# Patient Record
Sex: FEMALE | Race: WHITE | NOT HISPANIC OR LATINO | URBAN - METROPOLITAN AREA
[De-identification: names, ages, dates, MRNs, and addresses within clinical notes are randomized per-mention and may not be internally consistent; named-entity substitution may affect disease eponyms.]

---

## 2019-05-26 ENCOUNTER — INPATIENT HOSPITAL (OUTPATIENT)
Dept: URBAN - METROPOLITAN AREA HOSPITAL 107 | Facility: HOSPITAL | Age: 64
End: 2019-05-26

## 2019-05-26 DIAGNOSIS — R79.89 OTHER SPECIFIED ABNORMAL FINDINGS OF BLOOD CHEMISTRY: ICD-10-CM

## 2019-05-26 DIAGNOSIS — J44.1 CHRONIC OBSTRUCTIVE PULMONARY DISEASE WITH (ACUTE) EXACERBAT: ICD-10-CM

## 2019-05-26 DIAGNOSIS — R13.10 DYSPHAGIA, UNSPECIFIED: ICD-10-CM

## 2019-05-26 PROCEDURE — 99223 1ST HOSP IP/OBS HIGH 75: CPT | Performed by: INTERNAL MEDICINE

## 2019-05-28 ENCOUNTER — EMERGENCY ROOM – HOSPITAL (OUTPATIENT)
Dept: URBAN - METROPOLITAN AREA HOSPITAL 107 | Facility: HOSPITAL | Age: 64
End: 2019-05-28

## 2019-05-28 DIAGNOSIS — R13.19 OTHER DYSPHAGIA: ICD-10-CM

## 2019-05-28 DIAGNOSIS — R13.10 DYSPHAGIA, UNSPECIFIED: ICD-10-CM

## 2019-05-28 PROCEDURE — 99232 SBSQ HOSP IP/OBS MODERATE 35: CPT | Performed by: PHYSICIAN ASSISTANT

## 2019-05-30 PROCEDURE — 99232 SBSQ HOSP IP/OBS MODERATE 35: CPT | Performed by: PHYSICIAN ASSISTANT

## 2019-05-31 ENCOUNTER — INPATIENT HOSPITAL (OUTPATIENT)
Dept: URBAN - METROPOLITAN AREA HOSPITAL 107 | Facility: HOSPITAL | Age: 64
End: 2019-05-31

## 2019-05-31 DIAGNOSIS — K22.2 ESOPHAGEAL OBSTRUCTION: ICD-10-CM

## 2019-05-31 DIAGNOSIS — K44.9 DIAPHRAGMATIC HERNIA WITHOUT OBSTRUCTION OR GANGRENE: ICD-10-CM

## 2019-05-31 DIAGNOSIS — R13.10 DYSPHAGIA, UNSPECIFIED: ICD-10-CM

## 2019-05-31 PROCEDURE — 43249 ESOPH EGD DILATION <30 MM: CPT

## 2019-06-01 ENCOUNTER — INPATIENT HOSPITAL (OUTPATIENT)
Dept: URBAN - METROPOLITAN AREA HOSPITAL 107 | Facility: HOSPITAL | Age: 64
End: 2019-06-01

## 2019-06-01 DIAGNOSIS — R13.10 DYSPHAGIA, UNSPECIFIED: ICD-10-CM

## 2019-06-01 DIAGNOSIS — Z98.890 OTHER SPECIFIED POSTPROCEDURAL STATES: ICD-10-CM

## 2019-06-01 DIAGNOSIS — J44.1 CHRONIC OBSTRUCTIVE PULMONARY DISEASE WITH (ACUTE) EXACERBAT: ICD-10-CM

## 2019-06-01 PROCEDURE — 99231 SBSQ HOSP IP/OBS SF/LOW 25: CPT | Performed by: INTERNAL MEDICINE

## 2019-06-03 ENCOUNTER — INPATIENT HOSPITAL (OUTPATIENT)
Dept: URBAN - METROPOLITAN AREA HOSPITAL 107 | Facility: HOSPITAL | Age: 64
End: 2019-06-03

## 2019-06-03 DIAGNOSIS — K44.9 DIAPHRAGMATIC HERNIA WITHOUT OBSTRUCTION OR GANGRENE: ICD-10-CM

## 2019-06-03 DIAGNOSIS — R13.19 OTHER DYSPHAGIA: ICD-10-CM

## 2019-06-03 DIAGNOSIS — K22.2 ESOPHAGEAL OBSTRUCTION: ICD-10-CM

## 2019-06-03 DIAGNOSIS — R13.10 DYSPHAGIA, UNSPECIFIED: ICD-10-CM

## 2019-06-03 PROCEDURE — 99232 SBSQ HOSP IP/OBS MODERATE 35: CPT | Performed by: PHYSICIAN ASSISTANT

## 2019-08-11 ENCOUNTER — INPATIENT HOSPITAL (OUTPATIENT)
Dept: URBAN - METROPOLITAN AREA HOSPITAL 107 | Facility: HOSPITAL | Age: 64
End: 2019-08-11

## 2019-08-11 DIAGNOSIS — K22.2 ESOPHAGEAL OBSTRUCTION: ICD-10-CM

## 2019-08-11 DIAGNOSIS — K22.4 DYSKINESIA OF ESOPHAGUS: ICD-10-CM

## 2019-08-11 DIAGNOSIS — R11.2 NAUSEA WITH VOMITING, UNSPECIFIED: ICD-10-CM

## 2019-08-11 PROCEDURE — 99223 1ST HOSP IP/OBS HIGH 75: CPT | Performed by: INTERNAL MEDICINE

## 2019-08-12 ENCOUNTER — INPATIENT HOSPITAL (OUTPATIENT)
Dept: URBAN - METROPOLITAN AREA HOSPITAL 107 | Facility: HOSPITAL | Age: 64
End: 2019-08-12

## 2019-08-12 DIAGNOSIS — K22.2 ESOPHAGEAL OBSTRUCTION: ICD-10-CM

## 2019-08-12 DIAGNOSIS — R11.2 NAUSEA WITH VOMITING, UNSPECIFIED: ICD-10-CM

## 2019-08-12 DIAGNOSIS — K31.84 GASTROPARESIS: ICD-10-CM

## 2019-08-12 DIAGNOSIS — R13.10 DYSPHAGIA, UNSPECIFIED: ICD-10-CM

## 2019-08-12 DIAGNOSIS — R10.9 UNSPECIFIED ABDOMINAL PAIN: ICD-10-CM

## 2019-08-12 PROCEDURE — 99232 SBSQ HOSP IP/OBS MODERATE 35: CPT | Performed by: PHYSICIAN ASSISTANT

## 2019-08-13 PROCEDURE — 99232 SBSQ HOSP IP/OBS MODERATE 35: CPT | Performed by: PHYSICIAN ASSISTANT

## 2019-08-14 ENCOUNTER — INPATIENT HOSPITAL (OUTPATIENT)
Dept: URBAN - METROPOLITAN AREA HOSPITAL 107 | Facility: HOSPITAL | Age: 64
End: 2019-08-14

## 2019-08-14 DIAGNOSIS — R11.2 NAUSEA WITH VOMITING, UNSPECIFIED: ICD-10-CM

## 2019-08-14 DIAGNOSIS — R13.10 DYSPHAGIA, UNSPECIFIED: ICD-10-CM

## 2019-08-14 PROCEDURE — 99232 SBSQ HOSP IP/OBS MODERATE 35: CPT | Performed by: NURSE PRACTITIONER

## 2019-08-15 PROCEDURE — 99232 SBSQ HOSP IP/OBS MODERATE 35: CPT | Performed by: PHYSICIAN ASSISTANT

## 2019-12-11 ENCOUNTER — INPATIENT HOSPITAL (OUTPATIENT)
Dept: URBAN - METROPOLITAN AREA HOSPITAL 107 | Facility: HOSPITAL | Age: 64
End: 2019-12-11

## 2019-12-11 DIAGNOSIS — R11.2 NAUSEA WITH VOMITING, UNSPECIFIED: ICD-10-CM

## 2019-12-11 DIAGNOSIS — K22.4 DYSKINESIA OF ESOPHAGUS: ICD-10-CM

## 2019-12-11 DIAGNOSIS — K22.2 ESOPHAGEAL OBSTRUCTION: ICD-10-CM

## 2019-12-11 DIAGNOSIS — K31.84 GASTROPARESIS: ICD-10-CM

## 2019-12-11 DIAGNOSIS — R13.10 DYSPHAGIA, UNSPECIFIED: ICD-10-CM

## 2019-12-11 PROCEDURE — 99223 1ST HOSP IP/OBS HIGH 75: CPT | Performed by: INTERNAL MEDICINE

## 2019-12-13 ENCOUNTER — APPOINTMENT (OUTPATIENT)
Dept: GENERAL RADIOLOGY | Facility: HOSPITAL | Age: 64
End: 2019-12-13

## 2019-12-13 ENCOUNTER — HOSPITAL ENCOUNTER (INPATIENT)
Facility: HOSPITAL | Age: 64
LOS: 8 days | Discharge: NURSING, REHAB OR LONG-TERM CARE | End: 2019-12-21
Attending: EMERGENCY MEDICINE | Admitting: HOSPITALIST

## 2019-12-13 ENCOUNTER — APPOINTMENT (OUTPATIENT)
Dept: CT IMAGING | Facility: HOSPITAL | Age: 64
End: 2019-12-13

## 2019-12-13 ENCOUNTER — APPOINTMENT (OUTPATIENT)
Dept: CARDIOLOGY | Facility: HOSPITAL | Age: 64
End: 2019-12-13

## 2019-12-13 DIAGNOSIS — J18.9 PNEUMONIA OF BOTH UPPER LOBES DUE TO INFECTIOUS ORGANISM: Primary | ICD-10-CM

## 2019-12-13 DIAGNOSIS — Z74.09 DECREASED MOBILITY AND ENDURANCE: ICD-10-CM

## 2019-12-13 DIAGNOSIS — R07.9 CHEST PAIN, UNSPECIFIED TYPE: ICD-10-CM

## 2019-12-13 DIAGNOSIS — J44.1 COPD EXACERBATION (HCC): ICD-10-CM

## 2019-12-13 PROBLEM — E11.43 GASTROPARESIS DIABETICORUM (HCC): Status: ACTIVE | Noted: 2019-12-13

## 2019-12-13 PROBLEM — Z66 DNR (DO NOT RESUSCITATE): Status: ACTIVE | Noted: 2019-12-13

## 2019-12-13 PROBLEM — J96.12 CHRONIC RESPIRATORY FAILURE WITH HYPOXIA AND HYPERCAPNIA (HCC): Status: ACTIVE | Noted: 2019-12-13

## 2019-12-13 PROBLEM — N18.30 CHRONIC KIDNEY DISEASE, STAGE III (MODERATE) (HCC): Status: ACTIVE | Noted: 2019-12-13

## 2019-12-13 PROBLEM — I48.0 PAROXYSMAL ATRIAL FIBRILLATION (HCC): Status: ACTIVE | Noted: 2019-12-13

## 2019-12-13 PROBLEM — I10 ESSENTIAL HYPERTENSION: Status: ACTIVE | Noted: 2019-12-13

## 2019-12-13 PROBLEM — Z86.19 HISTORY OF ESBL E. COLI INFECTION: Status: ACTIVE | Noted: 2019-12-13

## 2019-12-13 PROBLEM — E11.59 TYPE 2 DIABETES MELLITUS WITH CIRCULATORY DISORDER, WITH LONG-TERM CURRENT USE OF INSULIN (HCC): Status: ACTIVE | Noted: 2019-12-13

## 2019-12-13 PROBLEM — E03.9 HYPOTHYROIDISM (ACQUIRED): Status: ACTIVE | Noted: 2019-12-13

## 2019-12-13 PROBLEM — E71.528: Status: ACTIVE | Noted: 2019-12-13

## 2019-12-13 PROBLEM — E27.1 ADDISON'S DISEASE (HCC): Status: ACTIVE | Noted: 2019-12-13

## 2019-12-13 PROBLEM — I25.10 CORONARY ARTERY DISEASE INVOLVING NATIVE CORONARY ARTERY WITHOUT ANGINA PECTORIS: Status: ACTIVE | Noted: 2019-12-13

## 2019-12-13 PROBLEM — K31.84 GASTROPARESIS DIABETICORUM (HCC): Status: ACTIVE | Noted: 2019-12-13

## 2019-12-13 PROBLEM — Z79.4 TYPE 2 DIABETES MELLITUS WITH CIRCULATORY DISORDER, WITH LONG-TERM CURRENT USE OF INSULIN (HCC): Status: ACTIVE | Noted: 2019-12-13

## 2019-12-13 PROBLEM — J96.11 CHRONIC RESPIRATORY FAILURE WITH HYPOXIA AND HYPERCAPNIA (HCC): Status: ACTIVE | Noted: 2019-12-13

## 2019-12-13 PROBLEM — I50.32 CHRONIC DIASTOLIC CHF (CONGESTIVE HEART FAILURE) (HCC): Status: ACTIVE | Noted: 2019-12-13

## 2019-12-13 LAB
ALBUMIN SERPL-MCNC: 3.4 G/DL (ref 3.5–5.2)
ALBUMIN/GLOB SERPL: 1.2 G/DL
ALP SERPL-CCNC: 89 U/L (ref 39–117)
ALT SERPL W P-5'-P-CCNC: 161 U/L (ref 1–33)
ANION GAP SERPL CALCULATED.3IONS-SCNC: 10.9 MMOL/L (ref 5–15)
APTT PPP: 21.4 SECONDS (ref 22.7–35.4)
AST SERPL-CCNC: 59 U/L (ref 1–32)
B PARAPERT DNA SPEC QL NAA+PROBE: NOT DETECTED
B PERT DNA SPEC QL NAA+PROBE: NOT DETECTED
BASOPHILS # BLD AUTO: 0.07 10*3/MM3 (ref 0–0.2)
BASOPHILS NFR BLD AUTO: 0.4 % (ref 0–1.5)
BH CV LOWER VASCULAR LEFT COMMON FEMORAL AUGMENT: NORMAL
BH CV LOWER VASCULAR LEFT COMMON FEMORAL COMPETENT: NORMAL
BH CV LOWER VASCULAR LEFT COMMON FEMORAL COMPRESS: NORMAL
BH CV LOWER VASCULAR LEFT COMMON FEMORAL PHASIC: NORMAL
BH CV LOWER VASCULAR LEFT COMMON FEMORAL SPONT: NORMAL
BH CV LOWER VASCULAR RIGHT COMMON FEMORAL AUGMENT: NORMAL
BH CV LOWER VASCULAR RIGHT COMMON FEMORAL COMPETENT: NORMAL
BH CV LOWER VASCULAR RIGHT COMMON FEMORAL COMPRESS: NORMAL
BH CV LOWER VASCULAR RIGHT COMMON FEMORAL PHASIC: NORMAL
BH CV LOWER VASCULAR RIGHT COMMON FEMORAL SPONT: NORMAL
BH CV LOWER VASCULAR RIGHT DISTAL FEMORAL COMPRESS: NORMAL
BH CV LOWER VASCULAR RIGHT GASTRONEMIUS COMPRESS: NORMAL
BH CV LOWER VASCULAR RIGHT GREATER SAPH AK COMPRESS: NORMAL
BH CV LOWER VASCULAR RIGHT GREATER SAPH BK COMPRESS: NORMAL
BH CV LOWER VASCULAR RIGHT MID FEMORAL AUGMENT: NORMAL
BH CV LOWER VASCULAR RIGHT MID FEMORAL COMPETENT: NORMAL
BH CV LOWER VASCULAR RIGHT MID FEMORAL COMPRESS: NORMAL
BH CV LOWER VASCULAR RIGHT MID FEMORAL PHASIC: NORMAL
BH CV LOWER VASCULAR RIGHT MID FEMORAL SPONT: NORMAL
BH CV LOWER VASCULAR RIGHT PERONEAL COMPRESS: NORMAL
BH CV LOWER VASCULAR RIGHT POPLITEAL AUGMENT: NORMAL
BH CV LOWER VASCULAR RIGHT POPLITEAL COMPETENT: NORMAL
BH CV LOWER VASCULAR RIGHT POPLITEAL COMPRESS: NORMAL
BH CV LOWER VASCULAR RIGHT POPLITEAL PHASIC: NORMAL
BH CV LOWER VASCULAR RIGHT POPLITEAL SPONT: NORMAL
BH CV LOWER VASCULAR RIGHT POSTERIOR TIBIAL COMPRESS: NORMAL
BH CV LOWER VASCULAR RIGHT PROFUNDA FEMORAL COMPRESS: NORMAL
BH CV LOWER VASCULAR RIGHT PROXIMAL FEMORAL COMPRESS: NORMAL
BH CV LOWER VASCULAR RIGHT SAPHENOFEMORAL JUNCTION COMPRESS: NORMAL
BILIRUB SERPL-MCNC: 0.3 MG/DL (ref 0.2–1.2)
BUN BLD-MCNC: 42 MG/DL (ref 8–23)
BUN/CREAT SERPL: 53.2 (ref 7–25)
C PNEUM DNA NPH QL NAA+NON-PROBE: NOT DETECTED
CALCIUM SPEC-SCNC: 9.2 MG/DL (ref 8.6–10.5)
CHLORIDE SERPL-SCNC: 104 MMOL/L (ref 98–107)
CO2 SERPL-SCNC: 25.1 MMOL/L (ref 22–29)
CREAT BLD-MCNC: 0.79 MG/DL (ref 0.57–1)
D-LACTATE SERPL-SCNC: 1 MMOL/L (ref 0.5–2)
DEPRECATED RDW RBC AUTO: 52.1 FL (ref 37–54)
EOSINOPHIL # BLD AUTO: 0.4 10*3/MM3 (ref 0–0.4)
EOSINOPHIL NFR BLD AUTO: 2.4 % (ref 0.3–6.2)
ERYTHROCYTE [DISTWIDTH] IN BLOOD BY AUTOMATED COUNT: 15.8 % (ref 12.3–15.4)
FLUAV H1 2009 PAND RNA NPH QL NAA+PROBE: NOT DETECTED
FLUAV H1 HA GENE NPH QL NAA+PROBE: NOT DETECTED
FLUAV H3 RNA NPH QL NAA+PROBE: NOT DETECTED
FLUAV SUBTYP SPEC NAA+PROBE: NOT DETECTED
FLUBV RNA ISLT QL NAA+PROBE: NOT DETECTED
GFR SERPL CREATININE-BSD FRML MDRD: 73 ML/MIN/1.73
GLOBULIN UR ELPH-MCNC: 2.9 GM/DL
GLUCOSE BLD-MCNC: 96 MG/DL (ref 65–99)
GLUCOSE BLDC GLUCOMTR-MCNC: 294 MG/DL (ref 70–130)
HADV DNA SPEC NAA+PROBE: NOT DETECTED
HCOV 229E RNA SPEC QL NAA+PROBE: NOT DETECTED
HCOV HKU1 RNA SPEC QL NAA+PROBE: NOT DETECTED
HCOV NL63 RNA SPEC QL NAA+PROBE: NOT DETECTED
HCOV OC43 RNA SPEC QL NAA+PROBE: NOT DETECTED
HCT VFR BLD AUTO: 37.5 % (ref 34–46.6)
HGB BLD-MCNC: 12.7 G/DL (ref 12–15.9)
HMPV RNA NPH QL NAA+NON-PROBE: NOT DETECTED
HPIV1 RNA SPEC QL NAA+PROBE: NOT DETECTED
HPIV2 RNA SPEC QL NAA+PROBE: NOT DETECTED
HPIV3 RNA NPH QL NAA+PROBE: NOT DETECTED
HPIV4 P GENE NPH QL NAA+PROBE: NOT DETECTED
IMM GRANULOCYTES # BLD AUTO: 0.69 10*3/MM3 (ref 0–0.05)
IMM GRANULOCYTES NFR BLD AUTO: 4.1 % (ref 0–0.5)
INR PPP: 0.91 (ref 0.9–1.1)
LYMPHOCYTES # BLD AUTO: 3.01 10*3/MM3 (ref 0.7–3.1)
LYMPHOCYTES NFR BLD AUTO: 18 % (ref 19.6–45.3)
M PNEUMO IGG SER IA-ACNC: NOT DETECTED
MCH RBC QN AUTO: 30.8 PG (ref 26.6–33)
MCHC RBC AUTO-ENTMCNC: 33.9 G/DL (ref 31.5–35.7)
MCV RBC AUTO: 91 FL (ref 79–97)
MONOCYTES # BLD AUTO: 1.16 10*3/MM3 (ref 0.1–0.9)
MONOCYTES NFR BLD AUTO: 6.9 % (ref 5–12)
NEUTROPHILS # BLD AUTO: 11.43 10*3/MM3 (ref 1.7–7)
NEUTROPHILS NFR BLD AUTO: 68.2 % (ref 42.7–76)
NRBC BLD AUTO-RTO: 0.2 /100 WBC (ref 0–0.2)
NT-PROBNP SERPL-MCNC: 1022 PG/ML (ref 5–900)
PLATELET # BLD AUTO: 210 10*3/MM3 (ref 140–450)
PMV BLD AUTO: 11 FL (ref 6–12)
POTASSIUM BLD-SCNC: 3.9 MMOL/L (ref 3.5–5.2)
PROCALCITONIN SERPL-MCNC: 0.12 NG/ML (ref 0.1–0.25)
PROT SERPL-MCNC: 6.3 G/DL (ref 6–8.5)
PROTHROMBIN TIME: 12 SECONDS (ref 11.7–14.2)
RBC # BLD AUTO: 4.12 10*6/MM3 (ref 3.77–5.28)
RHINOVIRUS RNA SPEC NAA+PROBE: NOT DETECTED
RSV RNA NPH QL NAA+NON-PROBE: NOT DETECTED
SODIUM BLD-SCNC: 140 MMOL/L (ref 136–145)
TROPONIN T SERPL-MCNC: <0.01 NG/ML (ref 0–0.03)
WBC NRBC COR # BLD: 16.76 10*3/MM3 (ref 3.4–10.8)

## 2019-12-13 PROCEDURE — 94799 UNLISTED PULMONARY SVC/PX: CPT

## 2019-12-13 PROCEDURE — 87641 MR-STAPH DNA AMP PROBE: CPT | Performed by: INTERNAL MEDICINE

## 2019-12-13 PROCEDURE — 93005 ELECTROCARDIOGRAM TRACING: CPT | Performed by: EMERGENCY MEDICINE

## 2019-12-13 PROCEDURE — 0100U HC BIOFIRE FILMARRAY RESP PANEL 2: CPT | Performed by: PHYSICIAN ASSISTANT

## 2019-12-13 PROCEDURE — 82962 GLUCOSE BLOOD TEST: CPT

## 2019-12-13 PROCEDURE — 71275 CT ANGIOGRAPHY CHEST: CPT

## 2019-12-13 PROCEDURE — 84484 ASSAY OF TROPONIN QUANT: CPT | Performed by: PHYSICIAN ASSISTANT

## 2019-12-13 PROCEDURE — 84145 PROCALCITONIN (PCT): CPT | Performed by: EMERGENCY MEDICINE

## 2019-12-13 PROCEDURE — 99285 EMERGENCY DEPT VISIT HI MDM: CPT

## 2019-12-13 PROCEDURE — 87040 BLOOD CULTURE FOR BACTERIA: CPT | Performed by: EMERGENCY MEDICINE

## 2019-12-13 PROCEDURE — 93971 EXTREMITY STUDY: CPT

## 2019-12-13 PROCEDURE — 63710000001 INSULIN GLARGINE PER 5 UNITS: Performed by: INTERNAL MEDICINE

## 2019-12-13 PROCEDURE — 85610 PROTHROMBIN TIME: CPT | Performed by: PHYSICIAN ASSISTANT

## 2019-12-13 PROCEDURE — 83605 ASSAY OF LACTIC ACID: CPT | Performed by: EMERGENCY MEDICINE

## 2019-12-13 PROCEDURE — 85730 THROMBOPLASTIN TIME PARTIAL: CPT | Performed by: PHYSICIAN ASSISTANT

## 2019-12-13 PROCEDURE — 83880 ASSAY OF NATRIURETIC PEPTIDE: CPT | Performed by: PHYSICIAN ASSISTANT

## 2019-12-13 PROCEDURE — 80053 COMPREHEN METABOLIC PANEL: CPT | Performed by: PHYSICIAN ASSISTANT

## 2019-12-13 PROCEDURE — 25010000002 PIPERACILLIN SOD-TAZOBACTAM PER 1 G: Performed by: INTERNAL MEDICINE

## 2019-12-13 PROCEDURE — 25010000002 CEFTRIAXONE PER 250 MG: Performed by: EMERGENCY MEDICINE

## 2019-12-13 PROCEDURE — 93005 ELECTROCARDIOGRAM TRACING: CPT

## 2019-12-13 PROCEDURE — 93010 ELECTROCARDIOGRAM REPORT: CPT | Performed by: INTERNAL MEDICINE

## 2019-12-13 PROCEDURE — 94640 AIRWAY INHALATION TREATMENT: CPT

## 2019-12-13 PROCEDURE — 85025 COMPLETE CBC W/AUTO DIFF WBC: CPT | Performed by: PHYSICIAN ASSISTANT

## 2019-12-13 PROCEDURE — 25010000002 AZITHROMYCIN PER 500 MG: Performed by: EMERGENCY MEDICINE

## 2019-12-13 PROCEDURE — 84484 ASSAY OF TROPONIN QUANT: CPT | Performed by: INTERNAL MEDICINE

## 2019-12-13 PROCEDURE — 25010000002 ENOXAPARIN PER 10 MG: Performed by: INTERNAL MEDICINE

## 2019-12-13 PROCEDURE — 25010000002 METHYLPREDNISOLONE PER 125 MG: Performed by: PHYSICIAN ASSISTANT

## 2019-12-13 PROCEDURE — 63710000001 INSULIN LISPRO (HUMAN) PER 5 UNITS: Performed by: INTERNAL MEDICINE

## 2019-12-13 PROCEDURE — 25010000002 METHYLPREDNISOLONE PER 125 MG: Performed by: INTERNAL MEDICINE

## 2019-12-13 PROCEDURE — 0 IOPAMIDOL PER 1 ML: Performed by: EMERGENCY MEDICINE

## 2019-12-13 RX ORDER — METHYLPREDNISOLONE SODIUM SUCCINATE 125 MG/2ML
60 INJECTION, POWDER, LYOPHILIZED, FOR SOLUTION INTRAMUSCULAR; INTRAVENOUS EVERY 8 HOURS
Status: DISCONTINUED | OUTPATIENT
Start: 2019-12-13 | End: 2019-12-16

## 2019-12-13 RX ORDER — ARFORMOTEROL TARTRATE 15 UG/2ML
15 SOLUTION RESPIRATORY (INHALATION)
Status: DISCONTINUED | OUTPATIENT
Start: 2019-12-13 | End: 2019-12-21 | Stop reason: HOSPADM

## 2019-12-13 RX ORDER — ALLOPURINOL 100 MG/1
100 TABLET ORAL DAILY
Status: DISCONTINUED | OUTPATIENT
Start: 2019-12-14 | End: 2019-12-21 | Stop reason: HOSPADM

## 2019-12-13 RX ORDER — AMLODIPINE BESYLATE 10 MG/1
10 TABLET ORAL DAILY
COMMUNITY

## 2019-12-13 RX ORDER — CLONAZEPAM 0.5 MG/1
0.25 TABLET ORAL 2 TIMES DAILY
COMMUNITY

## 2019-12-13 RX ORDER — GUAIFENESIN AND DEXTROMETHORPHAN HYDROBROMIDE 600; 30 MG/1; MG/1
1 TABLET, EXTENDED RELEASE ORAL 2 TIMES DAILY
Status: DISCONTINUED | OUTPATIENT
Start: 2019-12-13 | End: 2019-12-21 | Stop reason: HOSPADM

## 2019-12-13 RX ORDER — ERGOCALCIFEROL 1.25 MG/1
50000 CAPSULE ORAL WEEKLY
COMMUNITY

## 2019-12-13 RX ORDER — IPRATROPIUM BROMIDE AND ALBUTEROL SULFATE 2.5; .5 MG/3ML; MG/3ML
3 SOLUTION RESPIRATORY (INHALATION) ONCE
Status: COMPLETED | OUTPATIENT
Start: 2019-12-13 | End: 2019-12-13

## 2019-12-13 RX ORDER — NICOTINE POLACRILEX 4 MG
15 LOZENGE BUCCAL
Status: DISCONTINUED | OUTPATIENT
Start: 2019-12-13 | End: 2019-12-13

## 2019-12-13 RX ORDER — CLONAZEPAM 1 MG/1
2 TABLET ORAL 2 TIMES DAILY
COMMUNITY
End: 2019-12-13

## 2019-12-13 RX ORDER — CLONAZEPAM 0.5 MG/1
0.25 TABLET ORAL 2 TIMES DAILY
Status: DISCONTINUED | OUTPATIENT
Start: 2019-12-13 | End: 2019-12-21 | Stop reason: HOSPADM

## 2019-12-13 RX ORDER — ACETAMINOPHEN 160 MG/5ML
650 SOLUTION ORAL EVERY 4 HOURS PRN
Status: DISCONTINUED | OUTPATIENT
Start: 2019-12-13 | End: 2019-12-21 | Stop reason: HOSPADM

## 2019-12-13 RX ORDER — CHOLECALCIFEROL (VITAMIN D3) 125 MCG
5 CAPSULE ORAL NIGHTLY PRN
Status: DISCONTINUED | OUTPATIENT
Start: 2019-12-13 | End: 2019-12-21 | Stop reason: HOSPADM

## 2019-12-13 RX ORDER — ALBUTEROL SULFATE 2.5 MG/3ML
2.5 SOLUTION RESPIRATORY (INHALATION) ONCE
Status: COMPLETED | OUTPATIENT
Start: 2019-12-13 | End: 2019-12-13

## 2019-12-13 RX ORDER — TRAMADOL HYDROCHLORIDE 50 MG/1
50 TABLET ORAL 3 TIMES DAILY PRN
Status: DISCONTINUED | OUTPATIENT
Start: 2019-12-13 | End: 2019-12-21 | Stop reason: HOSPADM

## 2019-12-13 RX ORDER — METOCLOPRAMIDE 5 MG/1
5 TABLET ORAL
Status: DISCONTINUED | OUTPATIENT
Start: 2019-12-14 | End: 2019-12-17

## 2019-12-13 RX ORDER — SODIUM CHLORIDE 0.9 % (FLUSH) 0.9 %
10 SYRINGE (ML) INJECTION EVERY 12 HOURS SCHEDULED
Status: DISCONTINUED | OUTPATIENT
Start: 2019-12-13 | End: 2019-12-21 | Stop reason: HOSPADM

## 2019-12-13 RX ORDER — INSULIN GLARGINE 100 [IU]/ML
20 INJECTION, SOLUTION SUBCUTANEOUS NIGHTLY
COMMUNITY
End: 2019-12-21 | Stop reason: HOSPADM

## 2019-12-13 RX ORDER — BISACODYL 5 MG/1
5 TABLET, DELAYED RELEASE ORAL DAILY PRN
Status: DISCONTINUED | OUTPATIENT
Start: 2019-12-13 | End: 2019-12-21 | Stop reason: HOSPADM

## 2019-12-13 RX ORDER — ASPIRIN 81 MG/1
81 TABLET, CHEWABLE ORAL DAILY
Status: DISCONTINUED | OUTPATIENT
Start: 2019-12-14 | End: 2019-12-21 | Stop reason: HOSPADM

## 2019-12-13 RX ORDER — NITROGLYCERIN 0.4 MG/1
0.4 TABLET SUBLINGUAL
Status: DISCONTINUED | OUTPATIENT
Start: 2019-12-13 | End: 2019-12-21 | Stop reason: HOSPADM

## 2019-12-13 RX ORDER — LEVOTHYROXINE SODIUM 88 UG/1
88 TABLET ORAL
Status: DISCONTINUED | OUTPATIENT
Start: 2019-12-14 | End: 2019-12-21 | Stop reason: HOSPADM

## 2019-12-13 RX ORDER — ISOSORBIDE MONONITRATE 30 MG/1
30 TABLET, EXTENDED RELEASE ORAL DAILY
Status: DISCONTINUED | OUTPATIENT
Start: 2019-12-14 | End: 2019-12-21 | Stop reason: HOSPADM

## 2019-12-13 RX ORDER — TRAMADOL HYDROCHLORIDE 50 MG/1
50 TABLET ORAL 3 TIMES DAILY PRN
COMMUNITY
End: 2019-12-21 | Stop reason: HOSPADM

## 2019-12-13 RX ORDER — METHYLPREDNISOLONE SODIUM SUCCINATE 125 MG/2ML
125 INJECTION, POWDER, LYOPHILIZED, FOR SOLUTION INTRAMUSCULAR; INTRAVENOUS ONCE
Status: COMPLETED | OUTPATIENT
Start: 2019-12-13 | End: 2019-12-13

## 2019-12-13 RX ORDER — LEVOTHYROXINE SODIUM 88 UG/1
88 TABLET ORAL
COMMUNITY

## 2019-12-13 RX ORDER — FERROUS SULFATE 325(65) MG
325 TABLET ORAL
Status: ON HOLD | COMMUNITY
End: 2019-12-13

## 2019-12-13 RX ORDER — CLOPIDOGREL BISULFATE 75 MG/1
75 TABLET ORAL DAILY
Status: DISCONTINUED | OUTPATIENT
Start: 2019-12-14 | End: 2019-12-21 | Stop reason: HOSPADM

## 2019-12-13 RX ORDER — NICOTINE POLACRILEX 4 MG
15 LOZENGE BUCCAL
Status: DISCONTINUED | OUTPATIENT
Start: 2019-12-13 | End: 2019-12-19

## 2019-12-13 RX ORDER — CLOPIDOGREL BISULFATE 75 MG/1
75 TABLET ORAL DAILY
COMMUNITY

## 2019-12-13 RX ORDER — INSULIN GLARGINE 100 [IU]/ML
20 INJECTION, SOLUTION SUBCUTANEOUS NIGHTLY
Status: DISCONTINUED | OUTPATIENT
Start: 2019-12-13 | End: 2019-12-19

## 2019-12-13 RX ORDER — PREDNISONE 20 MG/1
20 TABLET ORAL DAILY
COMMUNITY
End: 2019-12-13

## 2019-12-13 RX ORDER — ACETAMINOPHEN 325 MG/1
650 TABLET ORAL EVERY 4 HOURS PRN
Status: DISCONTINUED | OUTPATIENT
Start: 2019-12-13 | End: 2019-12-21 | Stop reason: HOSPADM

## 2019-12-13 RX ORDER — DEXTROSE MONOHYDRATE 25 G/50ML
25 INJECTION, SOLUTION INTRAVENOUS
Status: DISCONTINUED | OUTPATIENT
Start: 2019-12-13 | End: 2019-12-19

## 2019-12-13 RX ORDER — ONDANSETRON 4 MG/1
4 TABLET, FILM COATED ORAL EVERY 6 HOURS PRN
Status: DISCONTINUED | OUTPATIENT
Start: 2019-12-13 | End: 2019-12-21 | Stop reason: HOSPADM

## 2019-12-13 RX ORDER — HYDROCODONE BITARTRATE AND ACETAMINOPHEN 7.5; 325 MG/1; MG/1
1 TABLET ORAL EVERY 6 HOURS PRN
COMMUNITY

## 2019-12-13 RX ORDER — BUDESONIDE 1 MG/2ML
1 INHALANT ORAL
Status: DISCONTINUED | OUTPATIENT
Start: 2019-12-13 | End: 2019-12-21 | Stop reason: HOSPADM

## 2019-12-13 RX ORDER — HYDROCORTISONE 10 MG/1
10 TABLET ORAL 2 TIMES DAILY
COMMUNITY

## 2019-12-13 RX ORDER — DEXTROSE MONOHYDRATE 25 G/50ML
25 INJECTION, SOLUTION INTRAVENOUS
Status: DISCONTINUED | OUTPATIENT
Start: 2019-12-13 | End: 2019-12-13

## 2019-12-13 RX ORDER — ASPIRIN 81 MG/1
81 TABLET, CHEWABLE ORAL DAILY
COMMUNITY

## 2019-12-13 RX ORDER — IPRATROPIUM BROMIDE AND ALBUTEROL SULFATE 2.5; .5 MG/3ML; MG/3ML
3 SOLUTION RESPIRATORY (INHALATION)
Status: DISCONTINUED | OUTPATIENT
Start: 2019-12-13 | End: 2019-12-21 | Stop reason: HOSPADM

## 2019-12-13 RX ORDER — CALCITONIN SALMON 200 [IU]/.09ML
1 SPRAY, METERED NASAL DAILY
COMMUNITY
End: 2019-12-21 | Stop reason: HOSPADM

## 2019-12-13 RX ORDER — ATORVASTATIN CALCIUM 40 MG/1
40 TABLET, FILM COATED ORAL DAILY
COMMUNITY

## 2019-12-13 RX ORDER — ALLOPURINOL 100 MG/1
100 TABLET ORAL DAILY
COMMUNITY

## 2019-12-13 RX ORDER — AMLODIPINE BESYLATE 10 MG/1
10 TABLET ORAL DAILY
Status: DISCONTINUED | OUTPATIENT
Start: 2019-12-14 | End: 2019-12-21 | Stop reason: HOSPADM

## 2019-12-13 RX ORDER — ONDANSETRON 2 MG/ML
4 INJECTION INTRAMUSCULAR; INTRAVENOUS EVERY 6 HOURS PRN
Status: DISCONTINUED | OUTPATIENT
Start: 2019-12-13 | End: 2019-12-21 | Stop reason: HOSPADM

## 2019-12-13 RX ORDER — ACETAMINOPHEN 650 MG/1
650 SUPPOSITORY RECTAL EVERY 4 HOURS PRN
Status: DISCONTINUED | OUTPATIENT
Start: 2019-12-13 | End: 2019-12-21 | Stop reason: HOSPADM

## 2019-12-13 RX ORDER — SODIUM CHLORIDE 0.9 % (FLUSH) 0.9 %
10 SYRINGE (ML) INJECTION AS NEEDED
Status: DISCONTINUED | OUTPATIENT
Start: 2019-12-13 | End: 2019-12-21 | Stop reason: HOSPADM

## 2019-12-13 RX ORDER — DOCUSATE SODIUM 100 MG/1
100 CAPSULE, LIQUID FILLED ORAL 2 TIMES DAILY
COMMUNITY

## 2019-12-13 RX ORDER — CEFTRIAXONE SODIUM 1 G/50ML
1 INJECTION, SOLUTION INTRAVENOUS ONCE
Status: COMPLETED | OUTPATIENT
Start: 2019-12-13 | End: 2019-12-13

## 2019-12-13 RX ORDER — ISOSORBIDE MONONITRATE 30 MG/1
30 TABLET, EXTENDED RELEASE ORAL DAILY
COMMUNITY

## 2019-12-13 RX ORDER — CALCITONIN SALMON 200 [IU]/.09ML
1 SPRAY, METERED NASAL DAILY
Status: DISCONTINUED | OUTPATIENT
Start: 2019-12-14 | End: 2019-12-21 | Stop reason: HOSPADM

## 2019-12-13 RX ORDER — HYDROCORTISONE 10 MG/1
10 TABLET ORAL 2 TIMES DAILY
Status: DISCONTINUED | OUTPATIENT
Start: 2019-12-13 | End: 2019-12-21 | Stop reason: HOSPADM

## 2019-12-13 RX ORDER — METOCLOPRAMIDE 5 MG/1
5 TABLET ORAL
COMMUNITY

## 2019-12-13 RX ORDER — HYDROCODONE BITARTRATE AND ACETAMINOPHEN 7.5; 325 MG/1; MG/1
1 TABLET ORAL EVERY 6 HOURS PRN
Status: DISCONTINUED | OUTPATIENT
Start: 2019-12-13 | End: 2019-12-16

## 2019-12-13 RX ORDER — BUDESONIDE AND FORMOTEROL FUMARATE DIHYDRATE 160; 4.5 UG/1; UG/1
2 AEROSOL RESPIRATORY (INHALATION)
COMMUNITY
End: 2019-12-21 | Stop reason: HOSPADM

## 2019-12-13 RX ORDER — CALCIUM CARBONATE 200(500)MG
2 TABLET,CHEWABLE ORAL 2 TIMES DAILY PRN
Status: DISCONTINUED | OUTPATIENT
Start: 2019-12-13 | End: 2019-12-21 | Stop reason: HOSPADM

## 2019-12-13 RX ORDER — DOCUSATE SODIUM 100 MG/1
100 CAPSULE, LIQUID FILLED ORAL 2 TIMES DAILY
Status: DISCONTINUED | OUTPATIENT
Start: 2019-12-13 | End: 2019-12-21 | Stop reason: HOSPADM

## 2019-12-13 RX ORDER — BENZONATATE 100 MG/1
100 CAPSULE ORAL 3 TIMES DAILY PRN
Status: DISCONTINUED | OUTPATIENT
Start: 2019-12-13 | End: 2019-12-21 | Stop reason: HOSPADM

## 2019-12-13 RX ORDER — ATORVASTATIN CALCIUM 20 MG/1
40 TABLET, FILM COATED ORAL DAILY
Status: DISCONTINUED | OUTPATIENT
Start: 2019-12-14 | End: 2019-12-21 | Stop reason: HOSPADM

## 2019-12-13 RX ORDER — INSULIN GLARGINE 100 [IU]/ML
30 INJECTION, SOLUTION SUBCUTANEOUS
Status: DISCONTINUED | OUTPATIENT
Start: 2019-12-14 | End: 2019-12-20

## 2019-12-13 RX ORDER — IPRATROPIUM BROMIDE AND ALBUTEROL SULFATE 2.5; .5 MG/3ML; MG/3ML
3 SOLUTION RESPIRATORY (INHALATION) EVERY 4 HOURS PRN
COMMUNITY
End: 2019-12-21 | Stop reason: HOSPADM

## 2019-12-13 RX ADMIN — METHYLPREDNISOLONE SODIUM SUCCINATE 125 MG: 125 INJECTION, POWDER, FOR SOLUTION INTRAMUSCULAR; INTRAVENOUS at 13:04

## 2019-12-13 RX ADMIN — DOCUSATE SODIUM 100 MG: 100 CAPSULE, LIQUID FILLED ORAL at 22:07

## 2019-12-13 RX ADMIN — AZITHROMYCIN 500 MG: 500 INJECTION, POWDER, LYOPHILIZED, FOR SOLUTION INTRAVENOUS at 19:12

## 2019-12-13 RX ADMIN — ALBUTEROL SULFATE 2.5 MG: 2.5 SOLUTION RESPIRATORY (INHALATION) at 16:34

## 2019-12-13 RX ADMIN — CLONAZEPAM 0.25 MG: 0.5 TABLET ORAL at 22:07

## 2019-12-13 RX ADMIN — GUAIFENESIN AND DEXTROMETHORPHAN HYDROBROMIDE 1 TABLET: 600; 30 TABLET, EXTENDED RELEASE ORAL at 22:16

## 2019-12-13 RX ADMIN — CEFTRIAXONE SODIUM 1 G: 1 INJECTION, SOLUTION INTRAVENOUS at 18:42

## 2019-12-13 RX ADMIN — IPRATROPIUM BROMIDE AND ALBUTEROL SULFATE 3 ML: 2.5; .5 SOLUTION RESPIRATORY (INHALATION) at 13:38

## 2019-12-13 RX ADMIN — METHYLPREDNISOLONE SODIUM SUCCINATE 60 MG: 125 INJECTION, POWDER, FOR SOLUTION INTRAMUSCULAR; INTRAVENOUS at 22:18

## 2019-12-13 RX ADMIN — HYDROCODONE BITARTRATE AND ACETAMINOPHEN 1 TABLET: 7.5; 325 TABLET ORAL at 22:16

## 2019-12-13 RX ADMIN — SODIUM CHLORIDE, PRESERVATIVE FREE 10 ML: 5 INJECTION INTRAVENOUS at 22:08

## 2019-12-13 RX ADMIN — ARFORMOTEROL TARTRATE 15 MCG: 15 SOLUTION RESPIRATORY (INHALATION) at 23:16

## 2019-12-13 RX ADMIN — TAZOBACTAM SODIUM AND PIPERACILLIN SODIUM 3.38 G: 375; 3 INJECTION, SOLUTION INTRAVENOUS at 22:19

## 2019-12-13 RX ADMIN — INSULIN GLARGINE 20 UNITS: 100 INJECTION, SOLUTION SUBCUTANEOUS at 22:16

## 2019-12-13 RX ADMIN — IPRATROPIUM BROMIDE AND ALBUTEROL SULFATE 3 ML: 2.5; .5 SOLUTION RESPIRATORY (INHALATION) at 13:08

## 2019-12-13 RX ADMIN — INSULIN LISPRO 12 UNITS: 100 INJECTION, SOLUTION INTRAVENOUS; SUBCUTANEOUS at 22:15

## 2019-12-13 RX ADMIN — IOPAMIDOL 95 ML: 755 INJECTION, SOLUTION INTRAVENOUS at 16:50

## 2019-12-13 RX ADMIN — HYDROCORTISONE 10 MG: 10 TABLET ORAL at 22:59

## 2019-12-13 RX ADMIN — ENOXAPARIN SODIUM 40 MG: 40 INJECTION SUBCUTANEOUS at 22:06

## 2019-12-13 NOTE — ED PROVIDER NOTES
EMERGENCY DEPARTMENT ENCOUNTER    Room Number:  21/21  Date seen:  12/13/2019  Time seen: 12:21 PM  PCP: Esha Medley    HPI:  Chief complaint: Chest pain  Context:Vanessa Colunga is a 64 y.o. female who presents to the ED with c/o constant left sided chest pain that began last night. She describes the pain as stabbing and states it is aggravated by taking a deep breath. The pain radiates into her left arm. She c/o associated SOA, fever (tmax 101), dry cough, postnasal drainage, and urinary frequency. She denies abd pain, N/V/D, hematuria, and dysuria. The patient denies any trips or travel, immobilization, or surgeries/trauma in the last 4 weeks, as well as any exogenous estrogen use, hemoptysis, hx of VTE, calf pain, or unilateral leg swelling. Pt uses a rolator to ambulate. Pt has a h/o MI, COPD, CHF, HTN, and DM. Pt is anticoagulated on Plavix and ASA.     Onset: gradual   Location: left chest   Radiation: radiates into left arm  Duration:  Began last night   Timing: constant   Character: stabbing   Aggravating Factors: deep breath  Alleviating Factors: none stated   Severity: moderate     MEDICAL RECORD REVIEW   Pt has a h/o RBBB on prior EKGs at [facility].  Pt had a cardiac cath on 3/23/19 that shows 80% stenosis on mid LAD with drug eluting stent placement. She was noted to have patent left circumflex and RCA stents.       ALLERGIES  Patient has no known allergies.    PAST MEDICAL HISTORY  Active Ambulatory Problems     Diagnosis Date Noted   • No Active Ambulatory Problems     Resolved Ambulatory Problems     Diagnosis Date Noted   • No Resolved Ambulatory Problems     Past Medical History:   Diagnosis Date   • CHF (congestive heart failure) (CMS/[facility])    • COPD (chronic obstructive pulmonary disease) (CMS/[facility])    • Diabetes mellitus (CMS/[facility])    • Hypertension        PAST SURGICAL HISTORY  Past Surgical History:   Procedure Laterality Date   • CHOLECYSTECTOMY     • CORONARY ANGIOPLASTY WITH STENT PLACEMENT       3 stents in 2007 (approximate)       FAMILY HISTORY  History reviewed. No pertinent family history.    SOCIAL HISTORY  Social History     Socioeconomic History   • Marital status:      Spouse name: Not on file   • Number of children: Not on file   • Years of education: Not on file   • Highest education level: Not on file   Tobacco Use   • Smoking status: Current Some Day Smoker   • Smokeless tobacco: Never Used   Substance and Sexual Activity   • Alcohol use: Not Currently   • Drug use: Never   • Sexual activity: Defer       REVIEW OF SYSTEMS  Review of Systems   Constitutional: Positive for fever (tmax 101). Negative for chills.   HENT: Positive for postnasal drip.    Eyes: Negative.    Respiratory: Positive for cough and shortness of breath.    Cardiovascular: Positive for chest pain. Negative for leg swelling.   Gastrointestinal: Negative for abdominal pain, diarrhea, nausea and vomiting.   Genitourinary: Positive for frequency. Negative for dysuria, hematuria and urgency.   Musculoskeletal: Negative.    Skin: Negative.    Neurological: Negative.    Psychiatric/Behavioral: Negative.        PHYSICAL EXAM  ED Triage Vitals [12/13/19 1146]   Temp Heart Rate Resp BP SpO2   98.6 °F (37 °C) 90 17 164/81 94 %      Temp src Heart Rate Source Patient Position BP Location FiO2 (%)   Tympanic Monitor -- -- --     Physical Exam   Constitutional: She is oriented to person, place, and time and well-developed, well-nourished, and in no distress. No distress.   HENT:   Head: Normocephalic and atraumatic.   Right Ear: External ear normal.   Left Ear: External ear normal.   Nose: Nose normal.   Eyes: Conjunctivae are normal.   Neck: Normal range of motion.   Cardiovascular: Normal rate and regular rhythm.   Pulmonary/Chest: Effort normal. She has wheezes (scattered). She has rhonchi. She has rales.   Abdominal: Soft. She exhibits no distension. There is no tenderness.   Musculoskeletal: Normal range of motion. She exhibits  edema (RLE) and tenderness (right calf).   No edema or tenderness of left calf    Neurological: She is alert and oriented to person, place, and time.   Skin: Skin is warm and dry.   Psychiatric: Affect normal.   Nursing note and vitals reviewed.      LAB RESULTS  Recent Results (from the past 24 hour(s))   Comprehensive Metabolic Panel    Collection Time: 12/13/19 12:36 PM   Result Value Ref Range    Glucose 96 65 - 99 mg/dL    BUN 42 (H) 8 - 23 mg/dL    Creatinine 0.79 0.57 - 1.00 mg/dL    Sodium 140 136 - 145 mmol/L    Potassium 3.9 3.5 - 5.2 mmol/L    Chloride 104 98 - 107 mmol/L    CO2 25.1 22.0 - 29.0 mmol/L    Calcium 9.2 8.6 - 10.5 mg/dL    Total Protein 6.3 6.0 - 8.5 g/dL    Albumin 3.40 (L) 3.50 - 5.20 g/dL    ALT (SGPT) 161 (H) 1 - 33 U/L    AST (SGOT) 59 (H) 1 - 32 U/L    Alkaline Phosphatase 89 39 - 117 U/L    Total Bilirubin 0.3 0.2 - 1.2 mg/dL    eGFR Non African Amer 73 >60 mL/min/1.73    Globulin 2.9 gm/dL    A/G Ratio 1.2 g/dL    BUN/Creatinine Ratio 53.2 (H) 7.0 - 25.0    Anion Gap 10.9 5.0 - 15.0 mmol/L   Troponin    Collection Time: 12/13/19 12:36 PM   Result Value Ref Range    Troponin T <0.010 0.000 - 0.030 ng/mL   CBC Auto Differential    Collection Time: 12/13/19 12:36 PM   Result Value Ref Range    WBC 16.76 (H) 3.40 - 10.80 10*3/mm3    RBC 4.12 3.77 - 5.28 10*6/mm3    Hemoglobin 12.7 12.0 - 15.9 g/dL    Hematocrit 37.5 34.0 - 46.6 %    MCV 91.0 79.0 - 97.0 fL    MCH 30.8 26.6 - 33.0 pg    MCHC 33.9 31.5 - 35.7 g/dL    RDW 15.8 (H) 12.3 - 15.4 %    RDW-SD 52.1 37.0 - 54.0 fl    MPV 11.0 6.0 - 12.0 fL    Platelets 210 140 - 450 10*3/mm3    Neutrophil % 68.2 42.7 - 76.0 %    Lymphocyte % 18.0 (L) 19.6 - 45.3 %    Monocyte % 6.9 5.0 - 12.0 %    Eosinophil % 2.4 0.3 - 6.2 %    Basophil % 0.4 0.0 - 1.5 %    Immature Grans % 4.1 (H) 0.0 - 0.5 %    Neutrophils, Absolute 11.43 (H) 1.70 - 7.00 10*3/mm3    Lymphocytes, Absolute 3.01 0.70 - 3.10 10*3/mm3    Monocytes, Absolute 1.16 (H) 0.10 - 0.90  10*3/mm3    Eosinophils, Absolute 0.40 0.00 - 0.40 10*3/mm3    Basophils, Absolute 0.07 0.00 - 0.20 10*3/mm3    Immature Grans, Absolute 0.69 (H) 0.00 - 0.05 10*3/mm3    nRBC 0.2 0.0 - 0.2 /100 WBC   BNP    Collection Time: 12/13/19 12:36 PM   Result Value Ref Range    proBNP 1,022.0 (H) 5.0 - 900.0 pg/mL   Protime-INR    Collection Time: 12/13/19  1:03 PM   Result Value Ref Range    Protime 12.0 11.7 - 14.2 Seconds    INR 0.91 0.90 - 1.10   aPTT    Collection Time: 12/13/19  1:03 PM   Result Value Ref Range    PTT 21.4 (L) 22.7 - 35.4 seconds   Duplex Venous Lower Extremity - Right    Collection Time: 12/13/19  2:40 PM   Result Value Ref Range    Right Common Femoral Spont Y     Right Common Femoral Phasic Y     Right Common Femoral Augment Y     Right Common Femoral Competent Y     Right Common Femoral Compress C     Right Saphenofemoral Junction Compress C     Right Profunda Femoral Compress C     Right Proximal Femoral Compress C     Right Mid Femoral Spont Y     Right Mid Femoral Phasic Y     Right Mid Femoral Augment Y     Right Mid Femoral Competent Y     Right Mid Femoral Compress C     Right Distal Femoral Compress C     Right Popliteal Spont Y     Right Popliteal Phasic Y     Right Popliteal Augment Y     Right Popliteal Competent Y     Right Popliteal Compress C     Right Posterior Tibial Compress C     Right Peroneal Compress C     Right GastronemiusSoleal Compress C     Right Greater Saph AK Compress C     Right Greater Saph BK Compress C     Left Common Femoral Spont Y     Left Common Femoral Phasic Y     Left Common Femoral Augment Y     Left Common Femoral Competent Y     Left Common Femoral Compress C        I ordered the above labs and reviewed the results    RADIOLOGY  XR Chest 2 View    (Results Pending)   CT Angiogram Chest    (Results Pending)          MEDICATIONS GIVEN IN ER  Medications   ipratropium-albuterol (DUO-NEB) nebulizer solution 3 mL (3 mL Nebulization Given 12/13/19 2668)    ipratropium-albuterol (DUO-NEB) nebulizer solution 3 mL (3 mL Nebulization Given 12/13/19 1338)   methylPREDNISolone sodium succinate (SOLU-Medrol) injection 125 mg (125 mg Intravenous Given 12/13/19 1304)       EKG  Interpreted by ED Physician    PROCEDURES  Procedures      PROGRESS AND CONSULTS    Progress Notes:    ED Course as of Dec 14 1047   Fri Dec 13, 2019   1600 I reassessed the patient. Lungs still sound very congested with rhonchi, wheezes, and rales. She is on 2L 02 NC at baseline. Does not have a pulmonologist.     [KA]   1610 Reviewed pt's history and workup with Dr. Liz.  After a bedside evaluation; Dr Liz agrees with the plan of care and will assume full course of care at this time.      [KA]      ED Course User Index  [KA] Helga Morrison PA     12:21 PM  Duoneb ordered for wheezing. CXR, doppler RLE, and labs ordered.     12:43 PM  Vascular tech report Doppler RLE is negative.     3:01 PM  CTA chest ordered, CXR cancelled.         DIAGNOSIS  Final diagnoses:   Pneumonia of both upper lobes due to infectious organism (CMS/HCC)   COPD exacerbation (CMS/HCC)   Chest pain, unspecified type         Documentation assistance provided by godwin Key for Helga Morrison PA-C.  Information recorded by the scribe was done at my direction and has been verified and validated by me.           Elsie Key  12/13/19 1506       Helga Morrison PA  12/14/19 1050

## 2019-12-13 NOTE — ED NOTES
Pt states she has not taken any of her medications or inhalers today.     Maty Redd, RN  12/13/19 4343

## 2019-12-13 NOTE — ED PROVIDER NOTES
"EMERGENCY DEPARTMENT ENCOUNTER    Room number:  21/21  Date Seen:  12/13/2019  Time of transfer: 1610  PCP:  Esha Medley     HPI:   Vanessa Colunga is a 64 y.o. female with hx of COPD who presents complaining of constant L-sided \"stabbing\" CP since last night.  She also confirms nonproductive cough.  Pt is on Plavix.      PEx:  GENERAL: A&Ox3, NAD  EYES: PERRL  HENT: moist mucous membranes.  Normocephalic and atraumatic   HEART: RRR  LUNGS: Wheezes in all lung fields  ABD: soft, non tender, non distended, bowel sounds positive  MSK: Reproducible L chest wall tenderness.  L leg has no edema but R leg has 1+ edema.  SKIN:  Multiple bruises to all extremities    NEURO: Normal neuro exam      EKG    EKG time: 1150  Rhythm/Rate: NSR/92  RBBB  No Acute Ischemia  Non-Specific ST-T changes    EKG done at [facility] on 12/4/2019 showed RBBB    Interpreted Contemporaneously by me.  Independently viewed by me      Laboratory Results:  Recent Results (from the past 24 hour(s))   Comprehensive Metabolic Panel    Collection Time: 12/13/19 12:36 PM   Result Value Ref Range    Glucose 96 65 - 99 mg/dL    BUN 42 (H) 8 - 23 mg/dL    Creatinine 0.79 0.57 - 1.00 mg/dL    Sodium 140 136 - 145 mmol/L    Potassium 3.9 3.5 - 5.2 mmol/L    Chloride 104 98 - 107 mmol/L    CO2 25.1 22.0 - 29.0 mmol/L    Calcium 9.2 8.6 - 10.5 mg/dL    Total Protein 6.3 6.0 - 8.5 g/dL    Albumin 3.40 (L) 3.50 - 5.20 g/dL    ALT (SGPT) 161 (H) 1 - 33 U/L    AST (SGOT) 59 (H) 1 - 32 U/L    Alkaline Phosphatase 89 39 - 117 U/L    Total Bilirubin 0.3 0.2 - 1.2 mg/dL    eGFR Non African Amer 73 >60 mL/min/1.73    Globulin 2.9 gm/dL    A/G Ratio 1.2 g/dL    BUN/Creatinine Ratio 53.2 (H) 7.0 - 25.0    Anion Gap 10.9 5.0 - 15.0 mmol/L   Troponin    Collection Time: 12/13/19 12:36 PM   Result Value Ref Range    Troponin T <0.010 0.000 - 0.030 ng/mL   CBC Auto Differential    Collection Time: 12/13/19 12:36 PM   Result Value Ref Range    WBC 16.76 (H) 3.40 - 10.80 " 10*3/mm3    RBC 4.12 3.77 - 5.28 10*6/mm3    Hemoglobin 12.7 12.0 - 15.9 g/dL    Hematocrit 37.5 34.0 - 46.6 %    MCV 91.0 79.0 - 97.0 fL    MCH 30.8 26.6 - 33.0 pg    MCHC 33.9 31.5 - 35.7 g/dL    RDW 15.8 (H) 12.3 - 15.4 %    RDW-SD 52.1 37.0 - 54.0 fl    MPV 11.0 6.0 - 12.0 fL    Platelets 210 140 - 450 10*3/mm3    Neutrophil % 68.2 42.7 - 76.0 %    Lymphocyte % 18.0 (L) 19.6 - 45.3 %    Monocyte % 6.9 5.0 - 12.0 %    Eosinophil % 2.4 0.3 - 6.2 %    Basophil % 0.4 0.0 - 1.5 %    Immature Grans % 4.1 (H) 0.0 - 0.5 %    Neutrophils, Absolute 11.43 (H) 1.70 - 7.00 10*3/mm3    Lymphocytes, Absolute 3.01 0.70 - 3.10 10*3/mm3    Monocytes, Absolute 1.16 (H) 0.10 - 0.90 10*3/mm3    Eosinophils, Absolute 0.40 0.00 - 0.40 10*3/mm3    Basophils, Absolute 0.07 0.00 - 0.20 10*3/mm3    Immature Grans, Absolute 0.69 (H) 0.00 - 0.05 10*3/mm3    nRBC 0.2 0.0 - 0.2 /100 WBC   BNP    Collection Time: 12/13/19 12:36 PM   Result Value Ref Range    proBNP 1,022.0 (H) 5.0 - 900.0 pg/mL   Protime-INR    Collection Time: 12/13/19  1:03 PM   Result Value Ref Range    Protime 12.0 11.7 - 14.2 Seconds    INR 0.91 0.90 - 1.10   aPTT    Collection Time: 12/13/19  1:03 PM   Result Value Ref Range    PTT 21.4 (L) 22.7 - 35.4 seconds   Respiratory Panel, PCR - Swab, Nasopharynx    Collection Time: 12/13/19  1:03 PM   Result Value Ref Range    ADENOVIRUS, PCR Not Detected Not Detected    Coronavirus 229E Not Detected Not Detected    Coronavirus HKU1 Not Detected Not Detected    Coronavirus NL63 Not Detected Not Detected    Coronavirus OC43 Not Detected Not Detected    Human Metapneumovirus Not Detected Not Detected    Human Rhinovirus/Enterovirus Not Detected Not Detected    Influenza B PCR Not Detected Not Detected    Parainfluenza Virus 1 Not Detected Not Detected    Parainfluenza Virus 2 Not Detected Not Detected    Parainfluenza Virus 3 Not Detected Not Detected    Parainfluenza Virus 4 Not Detected Not Detected    Bordetella pertussis pcr  Not Detected Not Detected    Influenza A H1 2009 PCR Not Detected Not Detected    Chlamydophila pneumoniae PCR Not Detected Not Detected    Mycoplasma pneumo by PCR Not Detected Not Detected    Influenza A PCR Not Detected Not Detected    Influenza A H3 Not Detected Not Detected    Influenza A H1 Not Detected Not Detected    RSV, PCR Not Detected Not Detected    Bordetella parapertussis PCR Not Detected Not Detected   Duplex Venous Lower Extremity - Right    Collection Time: 12/13/19  2:40 PM   Result Value Ref Range    Right Common Femoral Spont Y     Right Common Femoral Phasic Y     Right Common Femoral Augment Y     Right Common Femoral Competent Y     Right Common Femoral Compress C     Right Saphenofemoral Junction Compress C     Right Profunda Femoral Compress C     Right Proximal Femoral Compress C     Right Mid Femoral Spont Y     Right Mid Femoral Phasic Y     Right Mid Femoral Augment Y     Right Mid Femoral Competent Y     Right Mid Femoral Compress C     Right Distal Femoral Compress C     Right Popliteal Spont Y     Right Popliteal Phasic Y     Right Popliteal Augment Y     Right Popliteal Competent Y     Right Popliteal Compress C     Right Posterior Tibial Compress C     Right Peroneal Compress C     Right GastronemiusSoleal Compress C     Right Greater Saph AK Compress C     Right Greater Saph BK Compress C     Left Common Femoral Spont Y     Left Common Femoral Phasic Y     Left Common Femoral Augment Y     Left Common Femoral Competent Y     Left Common Femoral Compress C    Troponin    Collection Time: 12/13/19  4:14 PM   Result Value Ref Range    Troponin T <0.010 0.000 - 0.030 ng/mL   Procalcitonin    Collection Time: 12/13/19  4:14 PM   Result Value Ref Range    Procalcitonin 0.12 0.10 - 0.25 ng/mL     I reviewed the above results.    Radiology:  CT Angiogram Chest   Final Result   There are interstitial changes in the upper lobes, slightly   more extensive on the right than the left. These  are of unknown   chronicity and could represent chronic lung disease or some element of   acute pneumonia. There is no evidence of pulmonary thromboembolism.   Advanced arthritic change is observed at the right shoulder.       I discussed the case with Dr. Liz at 5:35 PM.       This report was finalized on 12/13/2019 5:56 PM by Dr. Anam Katz M.D.            I reviewed the above results    Medications ordered in ED:  Medications   azithromycin (ZITHROMAX) 500 mg 0.9% NaCl (Add-vantage) 250 mL (has no administration in time range)   cefTRIAXone (ROCEPHIN) IVPB 1 g (1 g Intravenous New Bag 12/13/19 1842)   ipratropium-albuterol (DUO-NEB) nebulizer solution 3 mL (3 mL Nebulization Given 12/13/19 1308)   ipratropium-albuterol (DUO-NEB) nebulizer solution 3 mL (3 mL Nebulization Given 12/13/19 1338)   methylPREDNISolone sodium succinate (SOLU-Medrol) injection 125 mg (125 mg Intravenous Given 12/13/19 1304)   albuterol (PROVENTIL) nebulizer solution 0.083% 2.5 mg/3mL (2.5 mg Nebulization Given 12/13/19 1634)   iopamidol (ISOVUE-370) 76 % injection 100 mL (95 mL Intravenous Given by Other 12/13/19 1650)       Progress and Consult Notes:  1610:  Patient care transferred from Michelle Morrison PA-C pending CTA chest.    1744: Received call from Dr. Katz (Radiology).  Pt's CTA chest shows bilateral upper lobe PNA but no PE.  Ordered labs for further evaluation.  Ordered Zithromax and Rocephin.      1820: Received call from Dr. Olson, ([facility]) and discussed pt's case.  Dr. Olson will admit the pt.      1846: Rechecked the patient who is feeling much better and her breathing has improved  Informed the patient of conversation with Dr. Olson. Discussed the plan for admission for further evaluation and management. Pt understands and agrees with the plan, all questions answered.        Diagnosis:  Final diagnoses:   Pneumonia of both upper lobes due to infectious organism (CMS/HCC)   COPD exacerbation (CMS/HCC)   Chest  pain, unspecified type       Provider attestation:  I personally reviewed the past medical history, past surgical history, social history, family history, current medications, and allergies as they appear in the chart.    The patient was seen and examined by myself and Michelle Morrison PA-C, who agree with plan.     --  Documentation assistance provided by godwin Munson for Dr. Agusto MD.  Information recorded by the scribe was done at my direction and has been verified and validated by me.     Zenon Munson  12/13/19 0027       Lamberto Liz MD  12/13/19 9580

## 2019-12-13 NOTE — PROGRESS NOTES
Duplex right leg venous Doppler study preliminary report: negative for dvt.  Called report to Helga Morrison PA

## 2019-12-14 LAB
ANION GAP SERPL CALCULATED.3IONS-SCNC: 10.8 MMOL/L (ref 5–15)
BASOPHILS # BLD AUTO: 0.02 10*3/MM3 (ref 0–0.2)
BASOPHILS NFR BLD AUTO: 0.2 % (ref 0–1.5)
BUN BLD-MCNC: 39 MG/DL (ref 8–23)
BUN/CREAT SERPL: 52 (ref 7–25)
CALCIUM SPEC-SCNC: 8.7 MG/DL (ref 8.6–10.5)
CHLORIDE SERPL-SCNC: 98 MMOL/L (ref 98–107)
CO2 SERPL-SCNC: 23.2 MMOL/L (ref 22–29)
CREAT BLD-MCNC: 0.75 MG/DL (ref 0.57–1)
DEPRECATED RDW RBC AUTO: 53.7 FL (ref 37–54)
EOSINOPHIL # BLD AUTO: 0 10*3/MM3 (ref 0–0.4)
EOSINOPHIL NFR BLD AUTO: 0 % (ref 0.3–6.2)
ERYTHROCYTE [DISTWIDTH] IN BLOOD BY AUTOMATED COUNT: 15.7 % (ref 12.3–15.4)
GFR SERPL CREATININE-BSD FRML MDRD: 77 ML/MIN/1.73
GLUCOSE BLD-MCNC: 290 MG/DL (ref 65–99)
GLUCOSE BLDC GLUCOMTR-MCNC: 248 MG/DL (ref 70–130)
GLUCOSE BLDC GLUCOMTR-MCNC: 293 MG/DL (ref 70–130)
GLUCOSE BLDC GLUCOMTR-MCNC: 331 MG/DL (ref 70–130)
GLUCOSE BLDC GLUCOMTR-MCNC: 382 MG/DL (ref 70–130)
HCT VFR BLD AUTO: 37.3 % (ref 34–46.6)
HGB BLD-MCNC: 11.9 G/DL (ref 12–15.9)
IMM GRANULOCYTES # BLD AUTO: 0.13 10*3/MM3 (ref 0–0.05)
IMM GRANULOCYTES NFR BLD AUTO: 1.6 % (ref 0–0.5)
L PNEUMO1 AG UR QL IA: NEGATIVE
LYMPHOCYTES # BLD AUTO: 0.48 10*3/MM3 (ref 0.7–3.1)
LYMPHOCYTES NFR BLD AUTO: 5.9 % (ref 19.6–45.3)
MCH RBC QN AUTO: 29.6 PG (ref 26.6–33)
MCHC RBC AUTO-ENTMCNC: 31.9 G/DL (ref 31.5–35.7)
MCV RBC AUTO: 92.8 FL (ref 79–97)
MONOCYTES # BLD AUTO: 0.11 10*3/MM3 (ref 0.1–0.9)
MONOCYTES NFR BLD AUTO: 1.4 % (ref 5–12)
MRSA DNA SPEC QL NAA+PROBE: NORMAL
NEUTROPHILS # BLD AUTO: 7.35 10*3/MM3 (ref 1.7–7)
NEUTROPHILS NFR BLD AUTO: 90.9 % (ref 42.7–76)
NRBC BLD AUTO-RTO: 0.1 /100 WBC (ref 0–0.2)
PLATELET # BLD AUTO: 171 10*3/MM3 (ref 140–450)
PMV BLD AUTO: 11 FL (ref 6–12)
POTASSIUM BLD-SCNC: 4.7 MMOL/L (ref 3.5–5.2)
RBC # BLD AUTO: 4.02 10*6/MM3 (ref 3.77–5.28)
S PNEUM AG SPEC QL LA: NEGATIVE
SODIUM BLD-SCNC: 132 MMOL/L (ref 136–145)
WBC NRBC COR # BLD: 8.09 10*3/MM3 (ref 3.4–10.8)

## 2019-12-14 PROCEDURE — 25010000002 METHYLPREDNISOLONE PER 125 MG: Performed by: INTERNAL MEDICINE

## 2019-12-14 PROCEDURE — 87899 AGENT NOS ASSAY W/OPTIC: CPT | Performed by: INTERNAL MEDICINE

## 2019-12-14 PROCEDURE — 94799 UNLISTED PULMONARY SVC/PX: CPT

## 2019-12-14 PROCEDURE — 63710000001 INSULIN GLARGINE PER 5 UNITS: Performed by: INTERNAL MEDICINE

## 2019-12-14 PROCEDURE — 25010000002 PIPERACILLIN SOD-TAZOBACTAM PER 1 G: Performed by: INTERNAL MEDICINE

## 2019-12-14 PROCEDURE — 97530 THERAPEUTIC ACTIVITIES: CPT

## 2019-12-14 PROCEDURE — 85025 COMPLETE CBC W/AUTO DIFF WBC: CPT | Performed by: INTERNAL MEDICINE

## 2019-12-14 PROCEDURE — 63710000001 INSULIN LISPRO (HUMAN) PER 5 UNITS: Performed by: INTERNAL MEDICINE

## 2019-12-14 PROCEDURE — 80048 BASIC METABOLIC PNL TOTAL CA: CPT | Performed by: INTERNAL MEDICINE

## 2019-12-14 PROCEDURE — 82962 GLUCOSE BLOOD TEST: CPT

## 2019-12-14 PROCEDURE — 25010000002 ENOXAPARIN PER 10 MG: Performed by: INTERNAL MEDICINE

## 2019-12-14 PROCEDURE — 36415 COLL VENOUS BLD VENIPUNCTURE: CPT | Performed by: INTERNAL MEDICINE

## 2019-12-14 PROCEDURE — 97161 PT EVAL LOW COMPLEX 20 MIN: CPT

## 2019-12-14 RX ADMIN — METHYLPREDNISOLONE SODIUM SUCCINATE 60 MG: 125 INJECTION, POWDER, FOR SOLUTION INTRAMUSCULAR; INTRAVENOUS at 13:20

## 2019-12-14 RX ADMIN — ARFORMOTEROL TARTRATE 15 MCG: 15 SOLUTION RESPIRATORY (INHALATION) at 20:26

## 2019-12-14 RX ADMIN — INSULIN GLARGINE 30 UNITS: 100 INJECTION, SOLUTION SUBCUTANEOUS at 08:22

## 2019-12-14 RX ADMIN — TAZOBACTAM SODIUM AND PIPERACILLIN SODIUM 3.38 G: 375; 3 INJECTION, SOLUTION INTRAVENOUS at 03:59

## 2019-12-14 RX ADMIN — TAZOBACTAM SODIUM AND PIPERACILLIN SODIUM 3.38 G: 375; 3 INJECTION, SOLUTION INTRAVENOUS at 18:08

## 2019-12-14 RX ADMIN — BUDESONIDE 1 MG: 1 SUSPENSION RESPIRATORY (INHALATION) at 20:28

## 2019-12-14 RX ADMIN — GUAIFENESIN AND DEXTROMETHORPHAN HYDROBROMIDE 1 TABLET: 600; 30 TABLET, EXTENDED RELEASE ORAL at 20:11

## 2019-12-14 RX ADMIN — BUDESONIDE 1 MG: 1 SUSPENSION RESPIRATORY (INHALATION) at 06:46

## 2019-12-14 RX ADMIN — GUAIFENESIN AND DEXTROMETHORPHAN HYDROBROMIDE 1 TABLET: 600; 30 TABLET, EXTENDED RELEASE ORAL at 08:23

## 2019-12-14 RX ADMIN — METHYLPREDNISOLONE SODIUM SUCCINATE 60 MG: 125 INJECTION, POWDER, FOR SOLUTION INTRAMUSCULAR; INTRAVENOUS at 05:30

## 2019-12-14 RX ADMIN — DOCUSATE SODIUM 100 MG: 100 CAPSULE, LIQUID FILLED ORAL at 08:23

## 2019-12-14 RX ADMIN — HYDROCORTISONE 10 MG: 10 TABLET ORAL at 20:11

## 2019-12-14 RX ADMIN — HYDROCODONE BITARTRATE AND ACETAMINOPHEN 1 TABLET: 7.5; 325 TABLET ORAL at 18:09

## 2019-12-14 RX ADMIN — AMLODIPINE BESYLATE 10 MG: 10 TABLET ORAL at 08:23

## 2019-12-14 RX ADMIN — ALLOPURINOL 100 MG: 100 TABLET ORAL at 08:23

## 2019-12-14 RX ADMIN — SODIUM CHLORIDE, PRESERVATIVE FREE 10 ML: 5 INJECTION INTRAVENOUS at 08:24

## 2019-12-14 RX ADMIN — INSULIN LISPRO 12 UNITS: 100 INJECTION, SOLUTION INTRAVENOUS; SUBCUTANEOUS at 08:23

## 2019-12-14 RX ADMIN — TAZOBACTAM SODIUM AND PIPERACILLIN SODIUM 3.38 G: 375; 3 INJECTION, SOLUTION INTRAVENOUS at 11:03

## 2019-12-14 RX ADMIN — CLOPIDOGREL 75 MG: 75 TABLET, FILM COATED ORAL at 08:23

## 2019-12-14 RX ADMIN — ARFORMOTEROL TARTRATE 15 MCG: 15 SOLUTION RESPIRATORY (INHALATION) at 06:46

## 2019-12-14 RX ADMIN — HYDROCORTISONE 10 MG: 10 TABLET ORAL at 08:23

## 2019-12-14 RX ADMIN — HYDROCODONE BITARTRATE AND ACETAMINOPHEN 1 TABLET: 7.5; 325 TABLET ORAL at 08:32

## 2019-12-14 RX ADMIN — METHYLPREDNISOLONE SODIUM SUCCINATE 60 MG: 125 INJECTION, POWDER, FOR SOLUTION INTRAMUSCULAR; INTRAVENOUS at 20:11

## 2019-12-14 RX ADMIN — METOCLOPRAMIDE 5 MG: 5 TABLET ORAL at 11:03

## 2019-12-14 RX ADMIN — SODIUM CHLORIDE, PRESERVATIVE FREE 10 ML: 5 INJECTION INTRAVENOUS at 20:11

## 2019-12-14 RX ADMIN — ISOSORBIDE MONONITRATE 30 MG: 30 TABLET ORAL at 08:23

## 2019-12-14 RX ADMIN — CLONAZEPAM 0.25 MG: 0.5 TABLET ORAL at 08:24

## 2019-12-14 RX ADMIN — ENOXAPARIN SODIUM 40 MG: 40 INJECTION SUBCUTANEOUS at 20:09

## 2019-12-14 RX ADMIN — LEVOTHYROXINE SODIUM 88 MCG: 88 TABLET ORAL at 05:30

## 2019-12-14 RX ADMIN — CLONAZEPAM 0.25 MG: 0.5 TABLET ORAL at 20:11

## 2019-12-14 RX ADMIN — INSULIN LISPRO 20 UNITS: 100 INJECTION, SOLUTION INTRAVENOUS; SUBCUTANEOUS at 13:19

## 2019-12-14 RX ADMIN — INSULIN GLARGINE 20 UNITS: 100 INJECTION, SOLUTION SUBCUTANEOUS at 21:19

## 2019-12-14 RX ADMIN — INSULIN LISPRO 8 UNITS: 100 INJECTION, SOLUTION INTRAVENOUS; SUBCUTANEOUS at 18:08

## 2019-12-14 RX ADMIN — INSULIN LISPRO 16 UNITS: 100 INJECTION, SOLUTION INTRAVENOUS; SUBCUTANEOUS at 21:19

## 2019-12-14 RX ADMIN — ATORVASTATIN CALCIUM 40 MG: 20 TABLET, FILM COATED ORAL at 08:23

## 2019-12-14 RX ADMIN — ASPIRIN 81 MG: 81 TABLET, CHEWABLE ORAL at 08:23

## 2019-12-14 RX ADMIN — IPRATROPIUM BROMIDE AND ALBUTEROL SULFATE 3 ML: 2.5; .5 SOLUTION RESPIRATORY (INHALATION) at 15:10

## 2019-12-14 RX ADMIN — METOCLOPRAMIDE 5 MG: 5 TABLET ORAL at 20:10

## 2019-12-14 RX ADMIN — IPRATROPIUM BROMIDE AND ALBUTEROL SULFATE 3 ML: 2.5; .5 SOLUTION RESPIRATORY (INHALATION) at 10:42

## 2019-12-14 RX ADMIN — CALCITONIN SALMON 1 SPRAY: 200 SPRAY, METERED NASAL at 11:03

## 2019-12-14 RX ADMIN — METOCLOPRAMIDE 5 MG: 5 TABLET ORAL at 08:23

## 2019-12-14 NOTE — PLAN OF CARE
Problem: Patient Care Overview  Goal: Plan of Care Review  Outcome: Ongoing (interventions implemented as appropriate)  Flowsheets (Taken 12/14/2019 4903)  Progress: improving  Plan of Care Reviewed With: patient  Note:   Admitted last pm from ED with PNA & COPD exac., VSS, no c/o chest pain since arrival as cc: in ED with CP; urine & MRSA nasal swab pending; maintaining sats with 2L n/c, purewick in place to prevent skin breakdown; instructed pt after PT eval today urge to d/c purewick and up to either BSC &/OR BRP with assistance; troponin negative x2; IV abx ongoing; enc turning; floating heels off bed; given pain med x1 for chronic rt shoulder pain, which was effective

## 2019-12-14 NOTE — PLAN OF CARE
Patient was reported to me at 1530. Negative for MRSA. Blood glucose levels remain elevated patient and she is unaware of last A1C levels. HR Tachy as she was eating her dinner, and feels short of breath at times. On 2L oxygen. Some noted in right rotator cuff. No N/V/D noted. No S/S of acute distress noted. Will continue to monitor.

## 2019-12-14 NOTE — PLAN OF CARE
Problem: Patient Care Overview  Goal: Plan of Care Review  Flowsheets (Taken 12/14/2019 0919)  Plan of Care Reviewed With: patient  Note:   PATIENT FATIGUES EASILY BUT WILLING TO PARTICIPATE AND WORK WITH PT.  WILL NEED PT TO ADDRESS HER ENDURANCE SO THAT SHE CAN RETURN TO PRIOR FUNCTIONING.

## 2019-12-14 NOTE — PROGRESS NOTES
Clinical Pharmacy Services: Medication History    Vanessa Colunga is a 64 y.o. female presenting to [facility] for   Chief Complaint   Patient presents with   • Chest Pain       She  has a past medical history of CHF (congestive heart failure) (CMS/[facility]), COPD (chronic obstructive pulmonary disease) (CMS/[facility]), Diabetes mellitus (CMS/[facility]), and Hypertension.    Allergies as of 12/13/2019   • (No Known Allergies)       Medication information was obtained from: [facility] records  Pharmacy and Phone Number: [phone]     Prior to Admission Medications     Prescriptions Last Dose Informant Patient Reported? Taking?    allopurinol (ZYLOPRIM) 100 MG tablet  Other Yes Yes    Take 100 mg by mouth Daily.    amLODIPine (NORVASC) 10 MG tablet  Other Yes Yes    Take 10 mg by mouth Daily.    aspirin 81 MG chewable tablet  Pharmacy Yes Yes    Chew 81 mg Daily.    atorvastatin (LIPITOR) 40 MG tablet  Pharmacy Yes Yes    Take 40 mg by mouth Daily.    budesonide-formoterol (SYMBICORT) 160-4.5 MCG/ACT inhaler  Other Yes Yes    Inhale 2 puffs 2 (Two) Times a Day.    calcitonin, salmon, (MIACALCIN) 200 UNIT/ACT nasal spray  Other Yes Yes    1 spray by Alternating Nares route Daily.    clonazePAM (KlonoPIN) 0.5 MG tablet  Other Yes Yes    Take 0.25 mg by mouth 2 (Two) Times a Day As Needed for Seizures.    clopidogrel (PLAVIX) 75 MG tablet  Pharmacy Yes Yes    Take 75 mg by mouth Daily.    docusate sodium (COLACE) 100 MG capsule  Other Yes Yes    Take 100 mg by mouth 2 (Two) Times a Day.    ferrous sulfate 325 (65 FE) MG tablet  Other Yes Yes    Take 325 mg by mouth Daily With Breakfast.    HYDROcodone-acetaminophen (NORCO) 7.5-325 MG per tablet  Other Yes Yes    Take 1 tablet by mouth Every 6 (Six) Hours As Needed for Moderate Pain . Pt states max daily amount of 4 tablets - takes it daily.    hydrocortisone (CORTEF) 10 MG tablet  Other Yes Yes    Take 10 mg by mouth 2 (Two) Times a Day.    Insulin Glargine  (LANTUS SOLOSTAR) 100 UNIT/ML injection pen  Other Yes Yes    Inject 30 Units under the skin into the appropriate area as directed 2 (Two) Times a Day.    insulin lispro (HUMALOG) 100 UNIT/ML injection  Other Yes Yes    Inject  under the skin into the appropriate area as directed 3 (Three) Times a Day Before Meals. Sliding scale dosage. 140-169 (2 units), 170-199 (4 units), 200-249 (6 units),250-299 (8 units),300-349 (10 units),350-400 (11 units)    ipratropium-albuterol (DUO-NEB) 0.5-2.5 mg/3 ml nebulizer  Other Yes Yes    Take 3 mL by nebulization Every 4 (Four) Hours As Needed.    isosorbide mononitrate (IMDUR) 30 MG 24 hr tablet  Other Yes Yes    Take 30 mg by mouth Daily.    levothyroxine (SYNTHROID, LEVOTHROID) 88 MCG tablet  Other Yes Yes    Take 88 mcg by mouth Every Morning Before Breakfast.    metoclopramide (REGLAN) 5 MG tablet  Other Yes Yes    Take 5 mg by mouth 3 (Three) Times a Day Before Meals.    traMADol (ULTRAM) 50 MG tablet  Other Yes Yes    Take 50 mg by mouth 3 (Three) Times a Day As Needed for Moderate Pain .    vitamin D (ERGOCALCIFEROL) 1.25 MG (85483 UT) capsule capsule  Other Yes Yes    Take 50,000 Units by mouth 1 (One) Time Per Week. Pt takes on Tuesdays.            Medication notes:   This medication list is complete to the best of my knowledge as of 12/13/2019    Please call if questions.    Cris Upton [facility]  Medication History Technician  [phone]    12/13/2019 8:28 PM

## 2019-12-14 NOTE — THERAPY EVALUATION
Acute Care - Physical Therapy Initial Evaluation  [facility]     Patient Name: Vanessa Colunga  : 1955  MRN: [id number]  Today's Date: 2019   Onset of Illness/Injury or Date of Surgery: 19  Date of Referral to PT: 19  Referring Physician: DONY)      Admit Date: 2019    Visit Dx:     ICD-10-CM ICD-9-CM   1. Pneumonia of both upper lobes due to infectious organism (CMS/[facility]) J18.1 483.8   2. COPD exacerbation (CMS/[facility]) J44.1 491.21   3. Chest pain, unspecified type R07.9 786.50   4. Decreased mobility and endurance Z74.09 780.99     Patient Active Problem List   Diagnosis   • Pneumonia of both upper lobes due to infectious organism (CMS/HCC)   • Chronic diastolic CHF (congestive heart failure) (CMS/[facility])   • Type 2 diabetes mellitus with circulatory disorder, with long-term current use of insulin (CMS/[facility])   • Essential hypertension   • Chronic respiratory failure with hypoxia and hypercapnia (CMS/[facility])   • DNR (do not resuscitate)   • History of ESBL E. coli infection   • [place]'s disease (CMS/HCC)   • Chronic kidney disease, stage III (moderate) (CMS/HCC)   • Hypothyroidism (acquired)   • Gastroparesis diabeticorum (CMS/HCC)   • Paroxysmal atrial fibrillation (CMS/HCC)   • Coronary artery disease involving native coronary artery without angina pectoris   • COPD with acute exacerbation (CMS/[facility])     Past Medical History:   Diagnosis Date   • CHF (congestive heart failure) (CMS/[facility])    • COPD (chronic obstructive pulmonary disease) (CMS/[facility])    • Diabetes mellitus (CMS/[facility])    • Hypertension      Past Surgical History:   Procedure Laterality Date   • CHOLECYSTECTOMY     • CORONARY ANGIOPLASTY WITH STENT PLACEMENT      3 stents in  (approximate)        PT ASSESSMENT (last 12 hours)      Physical Therapy Evaluation     Row Name 19 0905          PT Evaluation Time/Intention    Subjective Information  complains of;weakness;fatigue;pain  -JR     Document Type  evaluation  -JR      Mode of Treatment  physical therapy  -JR     Total Evaluation Minutes, Physical Therapy  23  -JR     Patient Effort  good  -JR     Symptoms Noted During/After Treatment  fatigue  -JR     Comment  -- GAVE GOOD EFFORT.  HAS RIGHT SHOULDER PAIN.    -JR     Row Name 12/14/19 0905          General Information    Patient Profile Reviewed?  yes  -JR     Onset of Illness/Injury or Date of Surgery  12/13/19  -JR     Referring Physician  -- MARYLOU  -JR     Patient Observations  alert;cooperative;agree to therapy  -JR     General Observations of Patient  SUPINE IN BED, RESTING.  -JR     Prior Level of Function  independent:  -JR     Equipment Currently Used at Home  walker, rolling  -JR     Pertinent History of Current Functional Problem  -- PNEUMONIA, USES O2 AT HOME.    -JR     Existing Precautions/Restrictions  no known precautions/restrictions  -JR     Risks Reviewed  patient:;LOB;nausea/vomiting;dizziness;increased discomfort;change in vital signs;increased drainage;lines disloged  -JR     Benefits Reviewed  patient:;improve function;increase independence;increase strength;increase balance;decrease pain;decrease risk of DVT;improve skin integrity;increase knowledge  -JR     Barriers to Rehab  none identified  -JR     Row Name 12/14/19 0905          Relationship/Environment    Primary Source of Support/Comfort  child(kristen);friend  -JR     Lives With  alone  -JR     Row Name 12/14/19 0905          Resource/Environmental Concerns    Current Living Arrangements  home/apartment/condo  -JR     Row Name 12/14/19 0905          Cognitive Assessment/Intervention- PT/OT    Orientation Status (Cognition)  oriented x 3  -JR     Follows Commands (Cognition)  WNL  -JR     Safety Deficit (Cognitive)  other (see comments) HAS BED ALARM ON BUT SEEMS COGNITIVELY INTACT.   -JR     Row Name 12/14/19 0905          Bed Mobility Assessment/Treatment    Bed Mobility Assessment/Treatment  supine-sit;sit-supine  -JR     Supine-Sit [place] (Bed  Mobility)  minimum assist (75% patient effort);1 person assist  -JR     Sit-Supine [place] (Bed Mobility)  minimum assist (75% patient effort);1 person assist  -JR     Bed Mobility, Safety Issues  decreased use of arms for pushing/pulling;decreased use of legs for bridging/pushing  -JR     Row Name 12/14/19 0905          Transfer Assessment/Treatment    Transfer Assessment/Treatment  sit-stand transfer;stand-sit transfer  -     Sit-Stand [place] (Transfers)  minimum assist (75% patient effort);1 person assist  -JR     Stand-Sit [place] (Transfers)  minimum assist (75% patient effort);1 person assist  -JR     [facility] Name 12/14/19 0905          Sit-Stand Transfer    Assistive Device (Sit-Stand Transfers)  walker, front-wheeled  -[facility] Name 12/14/19 0905          Stand-Sit Transfer    Assistive Device (Stand-Sit Transfers)  walker, front-wheeled  -JR     Row Name 12/14/19 0905          Gait/Stairs Assessment/Training    Gait/Stairs Assessment/Training  gait/ambulation independence;gait/ambulation assistive device;distance ambulated;gait pattern  -     [place] Level (Gait)  verbal cues;nonverbal cues (demo/gesture);minimum assist (75% patient effort);1 person assist  -     Assistive Device (Gait)  walker, front-wheeled  -     Distance in Feet (Gait)  -- 3  -JR     Comment (Gait/Stairs)  AMBULATED X 3 FT X 2 REPS.  BECAME FATIGUED AND DIZZY AND ELECTED TO GO BACK TO BED.   -JR     Row Name 12/14/19 0905          General ROM    GENERAL ROM COMMENTS  R SHOULDER AROM IS DECREASED 50%. AROM FOR OTHER EXTREMITIES WFLs.    -JR     Row Name 12/14/19 0905          MMT (Manual Muscle Testing)    General MMT Comments  RIGHT SHOULDER IS 3/5. 4/5 WITH OTHER EXTREMITIES.    -[facility] Name 12/14/19 0905          Static Standing Balance    Level of [place] (Supported Standing, Static Balance)  minimal assist, 75% patient effort;1 person assist  -JR     Time Able to Maintain Position (Supported  Standing, Static Balance)  2 to 3 minutes  -JR     Assistive Device Utilized (Supported Standing, Static Balance)  walker, rolling  -JR     Comment (Supported Standing, Static Balance)  -- TOOK SIDE STEPS ALONG EDGE OF BED X 6 STEPS.   -JR     Row Name 12/14/19 0905          Pain Assessment    Additional Documentation  Pain Scale: Numbers Pre/Post-Treatment (Group)  -     Row Name 12/14/19 0905          Pain Scale: Numbers Pre/Post-Treatment    Pain Scale: Numbers, Pretreatment  6/10  -JR     Pain Scale: Numbers, Post-Treatment  6/10  -JR     Pain Location - Side  Right  -JR     Pain Location  shoulder  -JR     Pain Intervention(s)  Medication (See MAR);Repositioned  -JR     Row Name 12/14/19 0905          Plan of Care Review    Plan of Care Reviewed With  patient  -     Row Name 12/14/19 0905          Physical Therapy Clinical Impression    Date of Referral to PT  12/13/19  -JR     PT Diagnosis (PT Clinical Impression)  -- DECREASED MOBILITY AND ENDURANCE.   -JR     Prognosis (PT Clinical Impression)  -- GOOD  -JR     Functional Level at Time of Evaluation (PT Clinical Impression)  -- MIN ASSIST.   -JR     Patient/Family Goals Statement (PT Clinical Impression)  -- GO HOME AND RETURN TO PRIOR FUNCTION.    -JR     Criteria for Skilled Interventions Met (PT Clinical Impression)  yes;treatment indicated  -JR     Pathology/Pathophysiology Noted (Describe Specifically for Each System)  musculoskeletal;pulmonary  -JR     Impairments Found (describe specific impairments)  aerobic capacity/endurance;gait, locomotion, and balance;ROM  -JR     Functional Limitations in Following Categories (Describe Specific Limitations)  self-care;home management;community/leisure  -JR     Rehab Potential (PT Clinical Summary)  good, to achieve stated therapy goals  -JR     Predicted Duration of Therapy (PT)  -- 1 MONTH.   -JR     Care Plan Review (PT)  evaluation/treatment results reviewed;care plan/treatment goals  reviewed;risks/benefits reviewed;current/potential barriers reviewed;patient/other agree to care plan  -     Row Name 12/14/19 0905          Vital Signs    Pre Systolic BP Rehab  142  -JR     Pre Treatment Diastolic BP  70  -JR     Pretreatment Heart Rate (beats/min)  88  -JR     Intratreatment Heart Rate (beats/min)  83  -JR     Posttreatment Heart Rate (beats/min)  89  -JR     Pre SpO2 (%)  99  -JR     O2 Delivery Pre Treatment  supplemental O2  -JR     Intra SpO2 (%)  -- 94  -JR     O2 Delivery Intra Treatment  supplemental O2  -JR     Post SpO2 (%)  96  -JR     O2 Delivery Post Treatment  supplemental O2  -JR     Row Name 12/14/19 0905          Physical Therapy Goals    Bed Mobility Goal Selection (PT)  bed mobility, PT goal 1  -JR     Transfer Goal Selection (PT)  transfer, PT goal 1  -JR     Gait Training Goal Selection (PT)  gait training, PT goal 1  -     Row Name 12/14/19 0905          Bed Mobility Goal 1 (PT)    Activity/Assistive Device (Bed Mobility Goal 1, PT)  bed mobility activities, all  -JR     [place] Level/Cues Needed (Bed Mobility Goal 1, PT)  independent  -JR     Time Frame (Bed Mobility Goal 1, PT)  1 week  -     Row Name 12/14/19 0905          Transfer Goal 1 (PT)    Activity/Assistive Device (Transfer Goal 1, PT)  transfers, all;walker, rolling  -JR     [place] Level/Cues Needed (Transfer Goal 1, PT)  independent  -JR     Time Frame (Transfer Goal 1, PT)  2 weeks  -     Row Name 12/14/19 0905          Gait Training Goal 1 (PT)    Activity/Assistive Device (Gait Training Goal 1, PT)  gait (walking locomotion);decrease fall risk;improve balance and speed;increase endurance/gait distance;walker, rolling  -JR     [place] Level (Gait Training Goal 1, PT)  independent  -JR     Distance (Gait Goal 1, PT)  -- 300  -     Row Name 12/14/19 0905          Positioning and Restraints    Pre-Treatment Position  in bed  -JR     Post Treatment Position  bed  -JR     In Bed  notified  nsg;supine;call light within reach;encouraged to call for assist;exit alarm on  -       User Key  (r) = Recorded By, (t) = Taken By, (c) = Cosigned By    Initials Name Provider Type    Anam Caballero, KENISHA Physical Therapist          PT Recommendation and Plan  Anticipated Discharge Disposition (PT): home with home health  Planned Therapy Interventions (PT Eval): balance training, bed mobility training, gait training, home exercise program, ROM (range of motion), strengthening, transfer training  Therapy Frequency (PT Clinical Impression): daily  Outcome Summary/Treatment Plan (PT)  Anticipated Discharge Disposition (PT): home with home health  Plan of Care Reviewed With: patient  Outcome Measures     Row Name 12/14/19 0900             How much help from another person do you currently need...    Turning from your back to your side while in flat bed without using bedrails?  3  -JR      Moving from lying on back to sitting on the side of a flat bed without bedrails?  3  -JR      Moving to and from a bed to a chair (including a wheelchair)?  3  -JR      Standing up from a chair using your arms (e.g., wheelchair, bedside chair)?  3  -JR      Climbing 3-5 steps with a railing?  2  -JR      To walk in hospital room?  3  -JR      AM-PAC 6 Clicks Score (PT)  17  -JR         Functional Assessment    Outcome Measure Options  AM-PAC 6 Clicks Basic Mobility (PT)  -        User Key  (r) = Recorded By, (t) = Taken By, (c) = Cosigned By    Initials Name Provider Type    Anam Caballero PT Physical Therapist         Time Calculation:   PT Charges     Row Name 12/14/19 0921             Time Calculation    Start Time  0843  -JR      Stop Time  0921  -      Time Calculation (min)  38 min  -      PT Received On  12/14/19  -      PT - Next Appointment  12/15/19  -      PT Goal Re-Cert Due Date  12/28/19  -        User Key  (r) = Recorded By, (t) = Taken By, (c) = Cosigned By    Initials Name Provider Type      Anam Davison, PT Physical Therapist        Therapy Charges for Today     Code Description Service Date Service Provider Modifiers Qty    06566453110 HC PT EVAL LOW COMPLEXITY 2 12/14/2019 Anam Davison, PT GP 1    54404990329 HC PT THERAPEUTIC ACT EA 15 MIN 12/14/2019 Anam Davison, PT GP 1          PT G-Codes  Outcome Measure Options: AM-PAC 6 Clicks Basic Mobility (PT)  AM-PAC 6 Clicks Score (PT): 17      Anam Davison, PT  12/14/2019

## 2019-12-14 NOTE — H&P
Patient Name:  Vanessa Colunga  YOB: 1955  MRN:  [id number]  Admit Date:  12/13/2019  Patient Care Team:  Esha Medley as PCP - General (Nurse Practitioner)      Subjective   History Present Illness     Chief Complaint   Patient presents with   • Chest Pain       Ms. Colunga is a 64 y.o. smoker with a history of COPD on 2L NC, CKD stage 3, PAF, CAD, Diastolic CHF, hypothyroidism, HTN, Darron's Diease, and type 2 DM with gastroparesis that presents to [facility] complaining of worsening dyspnea from baseline and non-productive cough for the past 2-3d.  She denies sick contacts.  She typically receives her care at [facility] and she was hospitalized there early this month with a COPD exacerbation.  She was hospitalized there back in September as well with a UTI resulting in ESBL bacteremia and was treated with merrem at the time.     History of Present Illness  Review of Systems   Constitutional: Negative for chills and fever.   HENT: Negative for congestion, nosebleeds, sore throat and trouble swallowing.    Eyes: Negative for redness and visual disturbance.   Respiratory: Positive for cough, shortness of breath and wheezing. Negative for choking.    Cardiovascular: Positive for chest pain (sternal, with cough). Negative for palpitations and leg swelling.   Gastrointestinal: Negative for abdominal pain, constipation, diarrhea, nausea and vomiting.   Endocrine: Negative for cold intolerance and heat intolerance.   Genitourinary: Negative for difficulty urinating, dysuria, flank pain, frequency and hematuria.   Musculoskeletal: Negative for arthralgias and myalgias.   Skin: Negative for pallor and rash.   Neurological: Positive for weakness (generlized). Negative for dizziness, light-headedness and headaches.   Hematological: Negative for adenopathy. Bruises/bleeds easily.   Psychiatric/Behavioral: Negative for confusion and decreased concentration.        Personal History     Past Medical  History:   Diagnosis Date   • CHF (congestive heart failure) (CMS/[facility])    • COPD (chronic obstructive pulmonary disease) (CMS/[facility])    • Diabetes mellitus (CMS/[facility])    • Hypertension      Past Surgical History:   Procedure Laterality Date   • CHOLECYSTECTOMY     • CORONARY ANGIOPLASTY WITH STENT PLACEMENT      3 stents in 2007 (approximate)     History reviewed. No pertinent family history.  Social History     Tobacco Use   • Smoking status: Current Some Day Smoker   • Smokeless tobacco: Never Used   Substance Use Topics   • Alcohol use: Not Currently   • Drug use: Never     No current facility-administered medications on file prior to encounter.      Current Outpatient Medications on File Prior to Encounter   Medication Sig Dispense Refill   • allopurinol (ZYLOPRIM) 100 MG tablet Take 100 mg by mouth Daily.     • amLODIPine (NORVASC) 10 MG tablet Take 10 mg by mouth Daily.     • aspirin 81 MG chewable tablet Chew 81 mg Daily.     • atorvastatin (LIPITOR) 40 MG tablet Take 40 mg by mouth Daily.     • budesonide-formoterol (SYMBICORT) 160-4.5 MCG/ACT inhaler Inhale 2 puffs 2 (Two) Times a Day.     • calcitonin, salmon, (MIACALCIN) 200 UNIT/ACT nasal spray 1 spray by Alternating Nares route Daily.     • clonazePAM (KlonoPIN) 0.5 MG tablet Take 0.25 mg by mouth 2 (Two) Times a Day.     • clopidogrel (PLAVIX) 75 MG tablet Take 75 mg by mouth Daily.     • docusate sodium (COLACE) 100 MG capsule Take 100 mg by mouth 2 (Two) Times a Day.     • HYDROcodone-acetaminophen (NORCO) 7.5-325 MG per tablet Take 1 tablet by mouth Every 6 (Six) Hours As Needed for Moderate Pain . Pt states max daily amount of 4 tablets - takes it daily.     • hydrocortisone (CORTEF) 10 MG tablet Take 10 mg by mouth 2 (Two) Times a Day.     • Insulin Glargine (LANTUS SOLOSTAR) 100 UNIT/ML injection pen Inject 30 Units under the skin into the appropriate area as directed Daily With Breakfast.     • insulin glargine (LANTUS) 100 UNIT/ML injection Inject  20 Units under the skin into the appropriate area as directed Every Night.     • insulin lispro (HUMALOG) 100 UNIT/ML injection Inject  under the skin into the appropriate area as directed 3 (Three) Times a Day Before Meals. Sliding scale dosage. 140-169 (2 units), 170-199 (4 units), 200-249 (6 units),250-299 (8 units),300-349 (10 units),350-400 (11 units)     • ipratropium-albuterol (DUO-NEB) 0.5-2.5 mg/3 ml nebulizer Take 3 mL by nebulization Every 4 (Four) Hours As Needed.     • isosorbide mononitrate (IMDUR) 30 MG 24 hr tablet Take 30 mg by mouth Daily.     • levothyroxine (SYNTHROID, LEVOTHROID) 88 MCG tablet Take 88 mcg by mouth Every Morning Before Breakfast.     • metoclopramide (REGLAN) 5 MG tablet Take 5 mg by mouth 3 (Three) Times a Day Before Meals.     • traMADol (ULTRAM) 50 MG tablet Take 50 mg by mouth 3 (Three) Times a Day As Needed for Moderate Pain .     • vitamin D (ERGOCALCIFEROL) 1.25 MG (11153 UT) capsule capsule Take 50,000 Units by mouth 1 (One) Time Per Week. Pt takes on Tuesdays.     • [DISCONTINUED] clonazePAM (KlonoPIN) 1 MG tablet Take 2 mg by mouth 2 (Two) Times a Day. Takes 1/2 pill (equivalent of 1 mg) twice daily.     • [DISCONTINUED] ferrous sulfate 325 (65 FE) MG tablet Take 325 mg by mouth Daily With Breakfast.     • [DISCONTINUED] predniSONE (DELTASONE) 20 MG tablet Take 20 mg by mouth Daily.       No Known Allergies    Objective    Objective     Vital Signs  Temp:  [97.5 °F (36.4 °C)-98.6 °F (37 °C)] 97.5 °F (36.4 °C)  Heart Rate:  [77-91] 77  Resp:  [16-20] 16  BP: (140-168)/() 143/82  SpO2:  [93 %-99 %] 98 %  on  Flow (L/min):  [2] 2;   Device (Oxygen Therapy): nasal cannula  Body mass index is 31.04 kg/m².    Physical Exam   Constitutional: She is oriented to person, place, and time. No distress.   HENT:   Head: Normocephalic and atraumatic.   Mouth/Throat: Oropharynx is clear and moist.   Eyes: Pupils are equal, round, and reactive to light. Conjunctivae and EOM are  normal.   Neck: Normal range of motion. Neck supple.   Cardiovascular: Normal rate, regular rhythm and intact distal pulses.   Pulmonary/Chest: No stridor. She is in respiratory distress (speaking in short phrases). She has decreased breath sounds in the right middle field, the right lower field, the left middle field and the left lower field. She has wheezes (diffuse). She has no rales.   Abdominal: Soft. Bowel sounds are normal.   Musculoskeletal: She exhibits no edema or tenderness.   Neurological: She is alert and oriented to person, place, and time. No cranial nerve deficit. She exhibits normal muscle tone.   Skin: Skin is warm and dry. Capillary refill takes less than 2 seconds. She is not diaphoretic.   Psychiatric: She has a normal mood and affect. Her behavior is normal.   Nursing note and vitals reviewed.      Results Review:  I reviewed the patient's new clinical results.  I reviewed the patient's new imaging results and agree with the interpretation.  I reviewed the patient's other test results and agree with the interpretation  I personally viewed and interpreted the patient's EKG/Telemetry data  Discussed with ED provider.    Lab Results (last 24 hours)     Procedure Component Value Units Date/Time    CBC & Differential [[id number]] Collected:  12/13/19 1236    Specimen:  Blood Updated:  12/13/19 1245    Narrative:       The following orders were created for panel order CBC & Differential.  Procedure                               Abnormality         Status                     ---------                               -----------         ------                     CBC Auto Differential[[id number]]        Abnormal            Final result                 Please view results for these tests on the individual orders.    Comprehensive Metabolic Panel [[id number]]  (Abnormal) Collected:  12/13/19 1236    Specimen:  Blood Updated:  12/13/19 1309     Glucose 96 mg/dL      BUN 42 mg/dL      Creatinine 0.79 mg/dL       Sodium 140 mmol/L      Potassium 3.9 mmol/L      Chloride 104 mmol/L      CO2 25.1 mmol/L      Calcium 9.2 mg/dL      Total Protein 6.3 g/dL      Albumin 3.40 g/dL      ALT (SGPT) 161 U/L      AST (SGOT) 59 U/L      Alkaline Phosphatase 89 U/L      Total Bilirubin 0.3 mg/dL      eGFR Non African Amer 73 mL/min/1.73      Globulin 2.9 gm/dL      A/G Ratio 1.2 g/dL      BUN/Creatinine Ratio 53.2     Anion Gap 10.9 mmol/L     Narrative:       GFR Normal >60  Chronic Kidney Disease <60  Kidney Failure <15      Troponin [[id number]]  (Normal) Collected:  12/13/19 1236    Specimen:  Blood Updated:  12/13/19 1345     Troponin T <0.010 ng/mL     Narrative:       Troponin T Reference Range:  <= 0.03 ng/mL-   Negative for AMI  >0.03 ng/mL-     Abnormal for myocardial necrosis.  Clinicians would have to utilize clinical acumen, EKG, Troponin and serial changes to determine if it is an Acute Myocardial Infarction or myocardial injury due to an underlying chronic condition.     CBC Auto Differential [[id number]]  (Abnormal) Collected:  12/13/19 1236    Specimen:  Blood Updated:  12/13/19 1245     WBC 16.76 10*3/mm3      RBC 4.12 10*6/mm3      Hemoglobin 12.7 g/dL      Hematocrit 37.5 %      MCV 91.0 fL      MCH 30.8 pg      MCHC 33.9 g/dL      RDW 15.8 %      RDW-SD 52.1 fl      MPV 11.0 fL      Platelets 210 10*3/mm3      Neutrophil % 68.2 %      Lymphocyte % 18.0 %      Monocyte % 6.9 %      Eosinophil % 2.4 %      Basophil % 0.4 %      Immature Grans % 4.1 %      Neutrophils, Absolute 11.43 10*3/mm3      Lymphocytes, Absolute 3.01 10*3/mm3      Monocytes, Absolute 1.16 10*3/mm3      Eosinophils, Absolute 0.40 10*3/mm3      Basophils, Absolute 0.07 10*3/mm3      Immature Grans, Absolute 0.69 10*3/mm3      nRBC 0.2 /100 WBC     BNP [[id number]]  (Abnormal) Collected:  12/13/19 1236    Specimen:  Blood Updated:  12/13/19 1440     proBNP 1,022.0 pg/mL     Narrative:       Among patients with dyspnea, NT-proBNP is highly sensitive for  the detection of acute congestive heart failure. In addition NT-proBNP of <300 pg/ml effectively rules out acute congestive heart failure with 99% negative predictive value.      Protime-INR [[id number]]  (Normal) Collected:  12/13/19 1303    Specimen:  Blood Updated:  12/13/19 1333     Protime 12.0 Seconds      INR 0.91    aPTT [[id number]]  (Abnormal) Collected:  12/13/19 1303    Specimen:  Blood Updated:  12/13/19 1353     PTT 21.4 seconds     Respiratory Panel, PCR - Swab, Nasopharynx [[id number]]  (Normal) Collected:  12/13/19 1303    Specimen:  Swab from Nasopharynx Updated:  12/13/19 1504     ADENOVIRUS, PCR Not Detected     Coronavirus 229E Not Detected     Coronavirus HKU1 Not Detected     Coronavirus NL63 Not Detected     Coronavirus OC43 Not Detected     Human Metapneumovirus Not Detected     Human Rhinovirus/Enterovirus Not Detected     Influenza B PCR Not Detected     Parainfluenza Virus 1 Not Detected     Parainfluenza Virus 2 Not Detected     Parainfluenza Virus 3 Not Detected     Parainfluenza Virus 4 Not Detected     Bordetella pertussis pcr Not Detected     Influenza A H1 2009 PCR Not Detected     Chlamydophila pneumoniae PCR Not Detected     Mycoplasma pneumo by PCR Not Detected     Influenza A PCR Not Detected     Influenza A H3 Not Detected     Influenza A H1 Not Detected     RSV, PCR Not Detected     Bordetella parapertussis PCR Not Detected    Troponin [[id number]]  (Normal) Collected:  12/13/19 1614    Specimen:  Blood Updated:  12/13/19 1644     Troponin T <0.010 ng/mL     Narrative:       Troponin T Reference Range:  <= 0.03 ng/mL-   Negative for AMI  >0.03 ng/mL-     Abnormal for myocardial necrosis.  Clinicians would have to utilize clinical acumen, EKG, Troponin and serial changes to determine if it is an Acute Myocardial Infarction or myocardial injury due to an underlying chronic condition.     Procalcitonin [[id number]]  (Normal) Collected:  12/13/19 1614    Specimen:  Blood Updated:   "12/13/19 1810     Procalcitonin 0.12 ng/mL     Narrative:       As a Marker for Sepsis (Non-Neonates):   1. <0.5 ng/mL represents a low risk of severe sepsis and/or septic shock.  1. >2 ng/mL represents a high risk of severe sepsis and/or septic shock.    As a Marker for Lower Respiratory Tract Infections that require antibiotic therapy:  PCT on Admission     Antibiotic Therapy             6-12 Hrs later  > 0.5                Strongly Recommended            >0.25 - <0.5         Recommended  0.1 - 0.25           Discouraged                   Remeasure/reassess PCT  <0.1                 Strongly Discouraged          Remeasure/reassess PCT      As 28 day mortality risk marker: \"Change in Procalcitonin Result\" (> 80 % or <=80 %) if Day 0 (or Day 1) and Day 4 values are available. Refer to http://www.[facility]flex[facility]ct[facility]calculator.com/   Change in PCT <=80 %   A decrease of PCT levels below or equal to 80 % defines a positive change in PCT test result representing a higher risk for 28-day all-cause mortality of patients diagnosed with severe sepsis or septic shock.  Change in PCT > 80 %   A decrease of PCT levels of more than 80 % defines a negative change in PCT result representing a lower risk for 28-day all-cause mortality of patients diagnosed with severe sepsis or septic shock.                  Blood Culture - Blood, Arm, Left [[id number]] Collected:  12/13/19 1815    Specimen:  Blood from Arm, Left Updated:  12/13/19 1834    Blood Culture - Blood, Arm, Right [[id number]] Collected:  12/13/19 1830    Specimen:  Blood from Arm, Right Updated:  12/13/19 1834    Lactic Acid, Plasma [[id number]]  (Normal) Collected:  12/13/19 1830    Specimen:  Blood Updated:  12/13/19 1852     Lactate 1.0 mmol/L     POC Glucose Once [[id number]]  (Abnormal) Collected:  12/13/19 2048    Specimen:  Blood Updated:  12/13/19 2051     Glucose 294 mg/dL           Imaging Results (Last 24 Hours)     Procedure Component Value Units Date/Time    CT " Angiogram Chest [[id number]] Collected:  12/13/19 1751     Updated:  12/13/19 1759    Narrative:       CT ARTERIOGRAM CHEST     HISTORY: Cough and chest pain. Evaluate for pulmonary embolism.     TECHNIQUE: Spiral CT arteriogram of the chest with multiplanar and  three-dimensional reformatted images, produced at a separate  workstation, is provided. This is correlated with a venous Doppler  performed today and reported separately. 95 mL Isovue-370 contrast was  used.     Radiation dose reduction techniques were utilized, including automated  exposure control and exposure modulation based on body size.     FINDINGS: The thoracic aorta is normal in caliber and enhances normally.  The central pulmonary arteries are normal in caliber and enhance  normally as well. There is motion artifact obscuring peripheral lower  lobe arterial structures, but those that are not distorted by motion  enhance as expected. There is no thoracic lymphadenopathy. The  visualized upper abdominal solid organs have a normal appearance. There  is extensive atheromatous vascular calcification and there are clips  from previous cholecystectomy.     There is some septal thickening and ground glass density in the upper  lobes bilaterally, right more than left. On the left anteriorly there  also is some bronchiolectasis. It is unclear if these are acute or  chronic findings. The lungs otherwise appear clear. The costophrenic  sulci are dry. There is advanced osteoarthritic change at the right  glenohumeral joint. No body wall or bone lesion is identified.       Impression:       There are interstitial changes in the upper lobes, slightly  more extensive on the right than the left. These are of unknown  chronicity and could represent chronic lung disease or some element of  acute pneumonia. There is no evidence of pulmonary thromboembolism.  Advanced arthritic change is observed at the right shoulder.     I discussed the case with Dr. Liz at 5:35  PM.     This report was finalized on 12/13/2019 5:56 PM by Dr. Anam Katz M.D.                  ECG 12 Lead   Final Result   HEART RATE= 92  bpm   RR Interval= 656  ms   ID Interval= 150  ms   P Horizontal Axis= -32  deg   P Front Axis= 22  deg   QRSD Interval= 125  ms   QT Interval= 372  ms   QRS Axis= -28  deg   T Wave Axis= -4  deg   - ABNORMAL ECG -   Sinus rhythm   Right bundle branch block   Probable left ventricular hypertrophy   NO PRIOR TRACING AVAILABLE FOR COMPARISON   Electronically Signed By: Marcie Salinas ([facility]) 13-Dec-2019 14:54:29   Date and Time of Study: 2019-12-13 11:50:29      SCANNED EKG   Final Result           Assessment/Plan     Active Hospital Problems    Diagnosis POA   • Pneumonia of both upper lobes due to infectious organism (CMS/[facility]) [J18.1] Yes   • Chronic diastolic CHF (congestive heart failure) (CMS/[facility]) [I50.32] Yes   • Type 2 diabetes mellitus with circulatory disorder, with long-term current use of insulin (CMS/[facility]) [E11.59, Z79.4] Not Applicable   • Essential hypertension [I10] Yes   • Chronic respiratory failure with hypoxia and hypercapnia (CMS/[facility]) [J96.11, J96.12] Yes   • DNR (do not resuscitate) [Z66] Yes   • History of ESBL E. coli infection [Z86.19] Yes   • Darron's disease (CMS/[facility]) [E27.1] Yes   • Chronic kidney disease, stage III (moderate) (CMS/[facility]) [N18.3] Yes   • Hypothyroidism (acquired) [E03.9] Yes   • Gastroparesis diabeticorum (CMS/[facility]) [E11.43, K31.84] Yes   • Paroxysmal atrial fibrillation (CMS/[facility]) [I48.0] Yes   • Coronary artery disease involving native coronary artery without angina pectoris [I25.10] Yes   • COPD with acute exacerbation (CMS/[facility]) [J44.1] Yes   COPD with exacerbation  - has chronic hypoxic and hypercapnic respiratory failure (on 2L NC at home and baseline CO2 is around 50)  - RVP is negative  - CTA of the chest in ER is negative for PE but showing some bilateral upper lobe interstitial changes with question of pneumonia-these could  certainly be chronic but for now will cover with zosyn (she was hospitalized in the past 3 months and treated with abx) and check sputum culture and MRSA screen-procalcitonin was nml will repeat along with WBC which is elevated  - start on brovana/pulmicort in place of home symbicort  - schedule duo-nebs  - start on solu-medrol 60mg IV Q8H  - schedule mucinex and flutter valve  - oxygen as needed-keep sats 88-92%    Type 2 DM  - complications include CKD3, CAD, and gastroparesis  - will resume long-acting insulin regimen of lantus at 30 units QAM and 20 units QPM  - cover with high-dose ssi/hypoglycemia protocol while on steroid treatment  - continue reglan for gastroparesis which is well-controlled currently    CKD Stage 3   - baseline Cr around 1.0  - will monitor    Hypothyroidism  - continue synthroid    Redwood's Disease  - continue on cortef    CAD/Chronic Diastolic CHF/HTN  - Hx of LIN->LAD 3/23/19  - no anginal symptoms  - continue ASA, Plavix, statin  - continue Imdur, Norvasc  - she is not on chronic diuretics-will monitor volume status    I discussed the patient's findings and my recommendations with patient, nursing staff and ED provider.    VTE Prophylaxis - Lovenox 40 mg SC daily.  Code Status - DNR. I confirmed with the patient at bedside.       Issac Olson MD  [facility]  12/13/19  9:43 PM

## 2019-12-15 LAB
ANION GAP SERPL CALCULATED.3IONS-SCNC: 12 MMOL/L (ref 5–15)
BASOPHILS # BLD AUTO: 0.03 10*3/MM3 (ref 0–0.2)
BASOPHILS NFR BLD AUTO: 0.2 % (ref 0–1.5)
BUN BLD-MCNC: 39 MG/DL (ref 8–23)
BUN/CREAT SERPL: 37.1 (ref 7–25)
CALCIUM SPEC-SCNC: 8.5 MG/DL (ref 8.6–10.5)
CHLORIDE SERPL-SCNC: 102 MMOL/L (ref 98–107)
CO2 SERPL-SCNC: 25 MMOL/L (ref 22–29)
CREAT BLD-MCNC: 1.05 MG/DL (ref 0.57–1)
DEPRECATED RDW RBC AUTO: 52.5 FL (ref 37–54)
EOSINOPHIL # BLD AUTO: 0 10*3/MM3 (ref 0–0.4)
EOSINOPHIL NFR BLD AUTO: 0 % (ref 0.3–6.2)
ERYTHROCYTE [DISTWIDTH] IN BLOOD BY AUTOMATED COUNT: 15.8 % (ref 12.3–15.4)
GFR SERPL CREATININE-BSD FRML MDRD: 52 ML/MIN/1.73
GLUCOSE BLD-MCNC: 195 MG/DL (ref 65–99)
GLUCOSE BLDC GLUCOMTR-MCNC: 202 MG/DL (ref 70–130)
GLUCOSE BLDC GLUCOMTR-MCNC: 255 MG/DL (ref 70–130)
GLUCOSE BLDC GLUCOMTR-MCNC: 269 MG/DL (ref 70–130)
GLUCOSE BLDC GLUCOMTR-MCNC: 371 MG/DL (ref 70–130)
HCT VFR BLD AUTO: 31.7 % (ref 34–46.6)
HGB BLD-MCNC: 10.3 G/DL (ref 12–15.9)
IMM GRANULOCYTES # BLD AUTO: 0.17 10*3/MM3 (ref 0–0.05)
IMM GRANULOCYTES NFR BLD AUTO: 1.3 % (ref 0–0.5)
LYMPHOCYTES # BLD AUTO: 0.69 10*3/MM3 (ref 0.7–3.1)
LYMPHOCYTES NFR BLD AUTO: 5.3 % (ref 19.6–45.3)
MCH RBC QN AUTO: 29.5 PG (ref 26.6–33)
MCHC RBC AUTO-ENTMCNC: 32.5 G/DL (ref 31.5–35.7)
MCV RBC AUTO: 90.8 FL (ref 79–97)
MONOCYTES # BLD AUTO: 0.47 10*3/MM3 (ref 0.1–0.9)
MONOCYTES NFR BLD AUTO: 3.6 % (ref 5–12)
NEUTROPHILS # BLD AUTO: 11.68 10*3/MM3 (ref 1.7–7)
NEUTROPHILS NFR BLD AUTO: 89.6 % (ref 42.7–76)
NRBC BLD AUTO-RTO: 0 /100 WBC (ref 0–0.2)
PLATELET # BLD AUTO: 163 10*3/MM3 (ref 140–450)
PMV BLD AUTO: 10.8 FL (ref 6–12)
POTASSIUM BLD-SCNC: 4.3 MMOL/L (ref 3.5–5.2)
RBC # BLD AUTO: 3.49 10*6/MM3 (ref 3.77–5.28)
SODIUM BLD-SCNC: 139 MMOL/L (ref 136–145)
WBC NRBC COR # BLD: 13.04 10*3/MM3 (ref 3.4–10.8)

## 2019-12-15 PROCEDURE — 25010000002 ENOXAPARIN PER 10 MG: Performed by: INTERNAL MEDICINE

## 2019-12-15 PROCEDURE — 25010000002 PIPERACILLIN SOD-TAZOBACTAM PER 1 G: Performed by: INTERNAL MEDICINE

## 2019-12-15 PROCEDURE — 63710000001 INSULIN GLARGINE PER 5 UNITS: Performed by: INTERNAL MEDICINE

## 2019-12-15 PROCEDURE — 94799 UNLISTED PULMONARY SVC/PX: CPT

## 2019-12-15 PROCEDURE — 63710000001 INSULIN LISPRO (HUMAN) PER 5 UNITS: Performed by: INTERNAL MEDICINE

## 2019-12-15 PROCEDURE — 97110 THERAPEUTIC EXERCISES: CPT

## 2019-12-15 PROCEDURE — 85025 COMPLETE CBC W/AUTO DIFF WBC: CPT | Performed by: INTERNAL MEDICINE

## 2019-12-15 PROCEDURE — 36415 COLL VENOUS BLD VENIPUNCTURE: CPT | Performed by: INTERNAL MEDICINE

## 2019-12-15 PROCEDURE — 82962 GLUCOSE BLOOD TEST: CPT

## 2019-12-15 PROCEDURE — 80048 BASIC METABOLIC PNL TOTAL CA: CPT | Performed by: INTERNAL MEDICINE

## 2019-12-15 PROCEDURE — 25010000002 METHYLPREDNISOLONE PER 125 MG: Performed by: INTERNAL MEDICINE

## 2019-12-15 RX ADMIN — INSULIN LISPRO 20 UNITS: 100 INJECTION, SOLUTION INTRAVENOUS; SUBCUTANEOUS at 21:05

## 2019-12-15 RX ADMIN — TAZOBACTAM SODIUM AND PIPERACILLIN SODIUM 3.38 G: 375; 3 INJECTION, SOLUTION INTRAVENOUS at 03:27

## 2019-12-15 RX ADMIN — ENOXAPARIN SODIUM 40 MG: 40 INJECTION SUBCUTANEOUS at 20:55

## 2019-12-15 RX ADMIN — ATORVASTATIN CALCIUM 40 MG: 20 TABLET, FILM COATED ORAL at 08:08

## 2019-12-15 RX ADMIN — HYDROCORTISONE 10 MG: 10 TABLET ORAL at 21:06

## 2019-12-15 RX ADMIN — DOCUSATE SODIUM 100 MG: 100 CAPSULE, LIQUID FILLED ORAL at 21:06

## 2019-12-15 RX ADMIN — METOCLOPRAMIDE 5 MG: 5 TABLET ORAL at 17:08

## 2019-12-15 RX ADMIN — CLONAZEPAM 0.25 MG: 0.5 TABLET ORAL at 21:06

## 2019-12-15 RX ADMIN — HYDROCODONE BITARTRATE AND ACETAMINOPHEN 1 TABLET: 7.5; 325 TABLET ORAL at 08:14

## 2019-12-15 RX ADMIN — IPRATROPIUM BROMIDE AND ALBUTEROL SULFATE 3 ML: 2.5; .5 SOLUTION RESPIRATORY (INHALATION) at 12:51

## 2019-12-15 RX ADMIN — ARFORMOTEROL TARTRATE 15 MCG: 15 SOLUTION RESPIRATORY (INHALATION) at 07:18

## 2019-12-15 RX ADMIN — METHYLPREDNISOLONE SODIUM SUCCINATE 60 MG: 125 INJECTION, POWDER, FOR SOLUTION INTRAMUSCULAR; INTRAVENOUS at 05:34

## 2019-12-15 RX ADMIN — HYDROCORTISONE 10 MG: 10 TABLET ORAL at 08:08

## 2019-12-15 RX ADMIN — METHYLPREDNISOLONE SODIUM SUCCINATE 60 MG: 125 INJECTION, POWDER, FOR SOLUTION INTRAMUSCULAR; INTRAVENOUS at 12:01

## 2019-12-15 RX ADMIN — LEVOTHYROXINE SODIUM 88 MCG: 88 TABLET ORAL at 05:35

## 2019-12-15 RX ADMIN — ISOSORBIDE MONONITRATE 30 MG: 30 TABLET ORAL at 08:08

## 2019-12-15 RX ADMIN — GUAIFENESIN AND DEXTROMETHORPHAN HYDROBROMIDE 1 TABLET: 600; 30 TABLET, EXTENDED RELEASE ORAL at 21:06

## 2019-12-15 RX ADMIN — BUDESONIDE 1 MG: 1 SUSPENSION RESPIRATORY (INHALATION) at 20:22

## 2019-12-15 RX ADMIN — TAZOBACTAM SODIUM AND PIPERACILLIN SODIUM 3.38 G: 375; 3 INJECTION, SOLUTION INTRAVENOUS at 18:05

## 2019-12-15 RX ADMIN — DOCUSATE SODIUM 100 MG: 100 CAPSULE, LIQUID FILLED ORAL at 08:08

## 2019-12-15 RX ADMIN — ARFORMOTEROL TARTRATE 15 MCG: 15 SOLUTION RESPIRATORY (INHALATION) at 20:22

## 2019-12-15 RX ADMIN — INSULIN GLARGINE 30 UNITS: 100 INJECTION, SOLUTION SUBCUTANEOUS at 08:08

## 2019-12-15 RX ADMIN — METOCLOPRAMIDE 5 MG: 5 TABLET ORAL at 08:08

## 2019-12-15 RX ADMIN — GUAIFENESIN AND DEXTROMETHORPHAN HYDROBROMIDE 1 TABLET: 600; 30 TABLET, EXTENDED RELEASE ORAL at 08:07

## 2019-12-15 RX ADMIN — METHYLPREDNISOLONE SODIUM SUCCINATE 60 MG: 125 INJECTION, POWDER, FOR SOLUTION INTRAMUSCULAR; INTRAVENOUS at 21:06

## 2019-12-15 RX ADMIN — INSULIN LISPRO 12 UNITS: 100 INJECTION, SOLUTION INTRAVENOUS; SUBCUTANEOUS at 12:00

## 2019-12-15 RX ADMIN — INSULIN GLARGINE 20 UNITS: 100 INJECTION, SOLUTION SUBCUTANEOUS at 21:05

## 2019-12-15 RX ADMIN — BUDESONIDE 1 MG: 1 SUSPENSION RESPIRATORY (INHALATION) at 07:21

## 2019-12-15 RX ADMIN — METOCLOPRAMIDE 5 MG: 5 TABLET ORAL at 12:00

## 2019-12-15 RX ADMIN — CLOPIDOGREL 75 MG: 75 TABLET, FILM COATED ORAL at 08:08

## 2019-12-15 RX ADMIN — ASPIRIN 81 MG: 81 TABLET, CHEWABLE ORAL at 08:08

## 2019-12-15 RX ADMIN — AMLODIPINE BESYLATE 10 MG: 10 TABLET ORAL at 08:08

## 2019-12-15 RX ADMIN — ALLOPURINOL 100 MG: 100 TABLET ORAL at 08:08

## 2019-12-15 RX ADMIN — INSULIN LISPRO 12 UNITS: 100 INJECTION, SOLUTION INTRAVENOUS; SUBCUTANEOUS at 17:08

## 2019-12-15 RX ADMIN — HYDROCODONE BITARTRATE AND ACETAMINOPHEN 1 TABLET: 7.5; 325 TABLET ORAL at 21:06

## 2019-12-15 RX ADMIN — TAZOBACTAM SODIUM AND PIPERACILLIN SODIUM 3.38 G: 375; 3 INJECTION, SOLUTION INTRAVENOUS at 12:00

## 2019-12-15 RX ADMIN — CLONAZEPAM 0.25 MG: 0.5 TABLET ORAL at 08:07

## 2019-12-15 RX ADMIN — INSULIN LISPRO 8 UNITS: 100 INJECTION, SOLUTION INTRAVENOUS; SUBCUTANEOUS at 08:08

## 2019-12-15 RX ADMIN — IPRATROPIUM BROMIDE AND ALBUTEROL SULFATE 3 ML: 2.5; .5 SOLUTION RESPIRATORY (INHALATION) at 15:34

## 2019-12-15 RX ADMIN — SODIUM CHLORIDE, PRESERVATIVE FREE 10 ML: 5 INJECTION INTRAVENOUS at 21:06

## 2019-12-15 NOTE — DISCHARGE PLACEMENT REQUEST
"Steven Colunga (64 y.o. Female)     Date of Birth Social Security Number Address Home Phone MRN    1955  [address] [id number] [phone] [id number]    Anglican Marital Status          Amish        Admission Date Admission Type Admitting Provider Attending Provider Department, Room/Bed    12/13/19 Emergency Issac Olson MD Broaddus, Emmett J., MD [address], [address]    Discharge Date Discharge Disposition Discharge Destination                       Attending Provider:  Reji Abraham MD    Allergies:  No Known Allergies    Isolation:  Contact   Infection:  None   Code Status:  No CPR    Ht:  149.9 cm (59\")   Wt:  69.7 kg (153 lb 10.6 oz)    Admission Cmt:  None   Principal Problem:  None                Active Insurance as of 12/13/2019     Primary Coverage     Payor Plan Insurance Group Employer/Plan Group    MEDICARE MEDICARE A & B      Payor Plan Address Payor Plan Phone Number Payor Plan Fax Number Effective Dates    [address] [phone]  1/1/1996 - None Entered    [place] [address]       Subscriber Name Subscriber Birth Date Member ID       STEVEN COLUNGA 1955 [id number]                 Emergency Contacts      (Rel.) Home Phone Work Phone Mobile Phone    CLARISSA ARREGUIN (Other) -- -- [phone]    DIANA COLUNGA (Son) -- -- [phone]    Amy Arreguin (Relative) -- -- --              "

## 2019-12-15 NOTE — PLAN OF CARE
Problem: Patient Care Overview  Goal: Plan of Care Review  Outcome: Ongoing (interventions implemented as appropriate)  Flowsheets  Taken 12/14/2019 0443  Progress: improving  Taken 12/15/2019 0506  Plan of Care Reviewed With: patient;family  Note:   Pt enc to use BSC with assistance throughout the shift as removal of purewick done at beginning of shift to promote endurance & strengthening; however,pt has been incontinent throughout the night; VSS, NSR with BBB; nephew at bs early in shift & pt requesting information re: hospice care; d/w pt & nephew & requested MD to order under sticky notes of chart; will inform oncoming RN of hospice nurse needing to call nephew to arrange for time to include in visit. Cont with IV abx, dry nonproductive cough; assisting with repositioning/turns

## 2019-12-15 NOTE — PLAN OF CARE
Pt cooperative and agreeable to PT with encouragement to participate required. Pt tolerated activity  well with c/o SOA with instruction for PLB. Pt ambulated upto 24 feet with FWW CGA with no unsteadiness. Pt explained the benefits of activity to reach her  PLOF and voiced understanding.

## 2019-12-15 NOTE — THERAPY TREATMENT NOTE
Patient Name: Vanessa Colunga  : 1955    MRN: [id number]                              Today's Date: 12/15/2019       Admit Date: 2019    Visit Dx:     ICD-10-CM ICD-9-CM   1. Pneumonia of both upper lobes due to infectious organism (CMS/[facility]) J18.1 483.8   2. COPD exacerbation (CMS/[facility]) J44.1 491.21   3. Chest pain, unspecified type R07.9 786.50   4. Decreased mobility and endurance Z74.09 780.99     Patient Active Problem List   Diagnosis   • Pneumonia of both upper lobes due to infectious organism (CMS/[facility])   • Chronic diastolic CHF (congestive heart failure) (CMS/[facility])   • Type 2 diabetes mellitus with circulatory disorder, with long-term current use of insulin (CMS/[facility])   • Essential hypertension   • Chronic respiratory failure with hypoxia and hypercapnia (CMS/[facility])   • DNR (do not resuscitate)   • History of ESBL E. coli infection   • [place]'s disease (CMS/[facility])   • Chronic kidney disease, stage III (moderate) (CMS/[facility])   • Hypothyroidism (acquired)   • Gastroparesis diabeticorum (CMS/[facility])   • Paroxysmal atrial fibrillation (CMS/[facility])   • Coronary artery disease involving native coronary artery without angina pectoris   • COPD with acute exacerbation (CMS/[facility])     Past Medical History:   Diagnosis Date   • CHF (congestive heart failure) (CMS/[facility])    • COPD (chronic obstructive pulmonary disease) (CMS/[facility])    • Diabetes mellitus (CMS/[facility])    • Hypertension      Past Surgical History:   Procedure Laterality Date   • CHOLECYSTECTOMY     • CORONARY ANGIOPLASTY WITH STENT PLACEMENT      3 stents in  (approximate)     General Information     Row Name 12/15/19 0900          PT Evaluation Time/Intention    Document Type  therapy note (daily note)  -RH     Mode of Treatment  individual therapy;physical therapy  -RH     Row Name 12/15/19 0900          General Information    Existing Precautions/Restrictions  fall  -RH     Barriers to Rehab  medically complex  -RH     Row Name 12/15/19 0900          Cognitive  Assessment/Intervention- PT/OT    Orientation Status (Cognition)  oriented x 3  -     Row Name 12/15/19 0900          Safety Issues, Functional Mobility    Impairments Affecting Function (Mobility)  balance;endurance/activity tolerance;shortness of breath  -     Comment, Safety Issues/Impairments (Mobility)  gait belt for safety and instuction for PLB  -RH       User Key  (r) = Recorded By, (t) = Taken By, (c) = Cosigned By    Initials Name Provider Type     Ganga Reyes, SALBADOR Physical Therapy Assistant        Mobility     Row Name 12/15/19 0901          Bed Mobility Assessment/Treatment    Scooting/Bridging [place] (Bed Mobility)  moderate assist (50% patient effort);verbal cues  -     Supine-Sit [place] (Bed Mobility)  verbal cues;supervision  -     Sit-Supine [place] (Bed Mobility)  supervision;verbal cues  -     Assistive Device (Bed Mobility)  bed rails;draw sheet  -     Row Name 12/15/19 0901          Bed-Chair Transfer    Bed-Chair [place] (Transfers)  not tested  -     Row Name 12/15/19 0901          Sit-Stand Transfer    Sit-Stand [place] (Transfers)  contact guard;verbal cues  -     Assistive Device (Sit-Stand Transfers)  walker, front-wheeled  -     Row Name 12/15/19 0901          Gait/Stairs Assessment/Training    Gait/Stairs Assessment/Training  gait/ambulation independence;gait/ambulation assistive device;distance ambulated;gait pattern  -     [place] Level (Gait)  verbal cues;contact guard  -     Assistive Device (Gait)  walker, front-wheeled  -     Distance in Feet (Gait)  24 ft forward and backward  -     Pattern (Gait)  step-to  -     Deviations/Abnormal Patterns (Gait)  stride length decreased  -     Row Name 12/15/19 0901          Mobility Assessment/Intervention    Extremity Weight-bearing Status  -- FWB  -       User Key  (r) = Recorded By, (t) = Taken By, (c) = Cosigned By    Initials Name Provider Type    Ganga Land  W, PTA Physical Therapy Assistant        Obj/Interventions     Row Name 12/15/19 0904          Static Sitting Balance    Level of [place] (Unsupported Sitting, Static Balance)  supervision  -RH     Sitting Position (Unsupported Sitting, Static Balance)  sitting on edge of bed  -RH     Time Able to Maintain Position (Unsupported Sitting, Static Balance)  more than 5 minutes  -RH     Comment (Unsupported Sitting, Static Balance)  focus on alignment and endurance  -RH     Row Name 12/15/19 0904          Static Standing Balance    Level of [place] (Supported Standing, Static Balance)  standby assist  -RH     Time Able to Maintain Position (Supported Standing, Static Balance)  3 to 4 minutes  -RH     Assistive Device Utilized (Supported Standing, Static Balance)  walker, rolling  -RH     Comment (Supported Standing, Static Balance)  focus on posture and trunk control to improvce stength and endurance to reduce falls  -RH       User Key  (r) = Recorded By, (t) = Taken By, (c) = Cosigned By    Initials Name Provider Type    RH Ganga Reyes, SALBADOR Physical Therapy Assistant        Goals/Plan    No documentation.       Clinical Impression     Row Name 12/15/19 0905          Pain Scale: Numbers Pre/Post-Treatment    Pain Scale: Numbers, Pretreatment  8/10  -RH     Pain Scale: Numbers, Post-Treatment  8/10  -RH     Pain Location - Side  Right  -RH     Pain Location  shoulder  -RH     Pain Intervention(s)  Repositioned;Medication (See MAR)  -RH     Row Name 12/15/19 0905          Plan of Care Review    Progress  improving  -RH     [facility] Name 12/15/19 0905          Vital Signs    Posttreatment Heart Rate (beats/min)  83  -RH     Intra SpO2 (%)  97  -RH     O2 Delivery Intra Treatment  supplemental O2  -RH     Post SpO2 (%)  100  -RH     O2 Delivery Post Treatment  supplemental O2  -RH     Row Name 12/15/19 0905          Positioning and Restraints    Pre-Treatment Position  in bed  -RH     Post Treatment Position  bed   -RH     In Bed  fowlers;call light within reach;encouraged to call for assist;side rails up x2;legs elevated  -       User Key  (r) = Recorded By, (t) = Taken By, (c) = Cosigned By    Initials Name Provider Type     Ganga Reyes PTA Physical Therapy Assistant        Outcome Measures     Row Name 12/15/19 0907          How much help from another person do you currently need...    Turning from your back to your side while in flat bed without using bedrails?  3  -RH     Moving from lying on back to sitting on the side of a flat bed without bedrails?  3  -RH     Moving to and from a bed to a chair (including a wheelchair)?  3  -RH     Standing up from a chair using your arms (e.g., wheelchair, bedside chair)?  3  -RH     Climbing 3-5 steps with a railing?  2  -RH     To walk in hospital room?  3  -RH     AM-PAC 6 Clicks Score (PT)  17  -       User Key  (r) = Recorded By, (t) = Taken By, (c) = Cosigned By    Initials Name Provider Type     Ganga Reyes PTA Physical Therapy Assistant          PT Recommendation and Plan     Progress: improving     Time Calculation:   PT Charges     Row Name 12/15/19 0911             Time Calculation    Start Time  0839  -      Stop Time  0911  -      Time Calculation (min)  32 min  -      PT Received On  12/15/19  -      PT - Next Appointment  12/16/19  -        User Key  (r) = Recorded By, (t) = Taken By, (c) = Cosigned By    Initials Name Provider Type     Ganga Reyes PTA Physical Therapy Assistant        Therapy Charges for Today     Code Description Service Date Service Provider Modifiers Qty    42276561194 HC PT THER PROC EA 15 MIN 12/15/2019 Ganga Reyes PTA GP 2          PT G-Codes  Outcome Measure Options: AM-PAC 6 Clicks Basic Mobility (PT)  AM-PAC 6 Clicks Score (PT): 17    Ganga Reyes PTA  12/15/2019

## 2019-12-15 NOTE — PROGRESS NOTES
DAILY PROGRESS NOTE  [facility]    Patient Identification:  Name: Vanessa Colunga  Age: 64 y.o.  Sex: female  :  1955  MRN: [id number]         Primary Care Physician: Esha Medley    Subjective: patient is a little better but still weak and tired; still a little short of air; would like to get a hospice consult  Interval History: follow up for respiratory failure, hypertension, chronic hypoxic respiratory failure, copd exacerbation; chronic diastolic chf, ckd3    Objective:    Scheduled Meds:  allopurinol 100 mg Oral Daily   amLODIPine 10 mg Oral Daily   arformoterol 15 mcg Nebulization BID - RT   aspirin 81 mg Oral Daily   atorvastatin 40 mg Oral Daily   budesonide 1 mg Nebulization BID - RT   calcitonin (salmon) 1 spray Alternating Nares Daily   clonazePAM 0.25 mg Oral BID   clopidogrel 75 mg Oral Daily   docusate sodium 100 mg Oral BID   enoxaparin 40 mg Subcutaneous Q24H   guaifenesin-dextromethorphan 1 tablet Oral BID   hydrocortisone 10 mg Oral BID   influenza vaccine 0.5 mL Intramuscular Once   insulin glargine 20 Units Subcutaneous Nightly   insulin glargine 30 Units Subcutaneous Daily With Breakfast   insulin lispro 0-24 Units Subcutaneous 4x Daily With Meals & Nightly   ipratropium-albuterol 3 mL Nebulization 4x Daily - RT   isosorbide mononitrate 30 mg Oral Daily   levothyroxine 88 mcg Oral Q AM   methylPREDNISolone sodium succinate 60 mg Intravenous Q8H   metoclopramide 5 mg Oral TID AC   piperacillin-tazobactam 3.375 g Intravenous Q8H   sodium chloride 10 mL Intravenous Q12H     Continuous Infusions:     Vital signs in last 24 hours:  Temp:  [97 °F (36.1 °C)-98.2 °F (36.8 °C)] 98 °F (36.7 °C)  Heart Rate:  [72-98] 96  Resp:  [16-18] 16  BP: (137-167)/(65-82) 137/76    Intake/Output:    Intake/Output Summary (Last 24 hours) at 12/15/2019 1845  Last data filed at 12/15/2019 1822  Gross per 24 hour   Intake 710 ml   Output 300 ml   Net 410 ml       Exam:  /76 (BP Location: Left  "arm, Patient Position: Lying)   Pulse 96   Temp 98 °F (36.7 °C) (Oral)   Resp 16   Ht 149.9 cm (59\")   Wt 71.5 kg (157 lb 10.1 oz)   SpO2 100%   BMI 31.84 kg/m²     General Appearance:    Alert, cooperative, no distress, AAOx3; chronically ill-appearing                          Head:    Normocephalic, without obvious abnormality, atraumatic                           Eyes:    PERRL, conjunctiva/corneas clear, EOM's intact, both eyes                         Throat:   Lips, tongue, gums normal; oral mucosa pink and moist                           Neck:   Supple, symmetrical, trachea midline, no JVD                         Lungs:    Decreased breath sounds bilaterally, respirations unlabored                 Chest Wall:    No tenderness or deformity                          Heart:    Regular rate and rhythm, S1 and S2 normal, no murmur,no  Rub                                      or gallop                  Abdomen:     Soft, non-tender, bowel sounds active, no masses, no                                                        organomegaly                  Extremities:   Extremities normal, atraumatic, no cyanosis or edema                        Pulses:   Pulses palpable in all extremities                            Skin:   Skin is warm and dry,  no rashes or palpable lesions                  Neurologic:   CNII-XII intact, motor strength grossly intact, sensation grossly                                         intact to light touch, no focal deficits noted       Data Review:  Labs in chart were reviewed.  WBC   Date Value Ref Range Status   12/15/2019 13.04 (H) 3.40 - 10.80 10*3/mm3 Final     Hemoglobin   Date Value Ref Range Status   12/15/2019 10.3 (L) 12.0 - 15.9 g/dL Final     Hematocrit   Date Value Ref Range Status   12/15/2019 31.7 (L) 34.0 - 46.6 % Final     Platelets   Date Value Ref Range Status   12/15/2019 163 140 - 450 10*3/mm3 Final     Sodium   Date Value Ref Range Status   12/15/2019 139 136 - 145 " mmol/L Final     Potassium   Date Value Ref Range Status   12/15/2019 4.3 3.5 - 5.2 mmol/L Final     Chloride   Date Value Ref Range Status   12/15/2019 102 98 - 107 mmol/L Final     CO2   Date Value Ref Range Status   12/15/2019 25.0 22.0 - 29.0 mmol/L Final     BUN   Date Value Ref Range Status   12/15/2019 39 (H) 8 - 23 mg/dL Final     Creatinine   Date Value Ref Range Status   12/15/2019 1.05 (H) 0.57 - 1.00 mg/dL Final     Glucose   Date Value Ref Range Status   12/15/2019 195 (H) 65 - 99 mg/dL Final     Calcium   Date Value Ref Range Status   12/15/2019 8.5 (L) 8.6 - 10.5 mg/dL Final     AST (SGOT)   Date Value Ref Range Status   12/13/2019 59 (H) 1 - 32 U/L Final     ALT (SGPT)   Date Value Ref Range Status   12/13/2019 161 (H) 1 - 33 U/L Final     Alkaline Phosphatase   Date Value Ref Range Status   12/13/2019 89 39 - 117 U/L Final     Patient Active Problem List   Diagnosis Code   • Pneumonia of both upper lobes due to infectious organism (CMS/HCC) J18.1   • Chronic diastolic CHF (congestive heart failure) (CMS/HCC) I50.32   • Type 2 diabetes mellitus with circulatory disorder, with long-term current use of insulin (CMS/HCC) E11.59, Z79.4   • Essential hypertension I10   • Chronic respiratory failure with hypoxia and hypercapnia (CMS/HCC) J96.11, J96.12   • DNR (do not resuscitate) Z66   • History of ESBL E. coli infection Z86.19   • Dar[place]'s disease (CMS/HCC) E27.1   • Chronic kidney disease, stage III (moderate) (CMS/HCC) N18.3   • Hypothyroidism (acquired) E03.9   • Gastroparesis diabeticorum (CMS/HCC) E11.43, K31.84   • Paroxysmal atrial fibrillation (CMS/HCC) I48.0   • Coronary artery disease involving native coronary artery without angina pectoris I25.10   • COPD with acute exacerbation (CMS/HCC) J44.1       Assessment:    Pneumonia of both upper lobes due to infectious organism (CMS/HCC)    Chronic diastolic CHF (congestive heart failure) (CMS/HCC)    Type 2 diabetes mellitus with circulatory  disorder, with long-term current use of insulin (CMS/[facility])    Essential hypertension    Chronic respiratory failure with hypoxia and hypercapnia (CMS/[facility])    DNR (do not resuscitate)    History of ESBL E. 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[facility])    Chronic kidney disease, stage III (moderate) (CMS/[facility])    Hypothyroidism (acquired)    Gastroparesis diabeticorum (CMS/[facility])    Paroxysmal atrial fibrillation (CMS/[facility])    Coronary artery disease involving native coronary artery without angina pectoris    COPD with acute exacerbation (CMS/[facility])  anemia    Plan:  Will continue antibiotics  Hospice consult was called earlier  Blood sugar is a little elevated  Monitor hgb  Patient is dnr and long term prognosis is likely guarded  INGRIDw nurse and patient  Medium risk    Shanice Magana MD  12/15/2019  6:45 PM

## 2019-12-15 NOTE — PROGRESS NOTES
Discharge Planning Assessment  [facility]     Patient Name: Vanessa Colunga  MRN: [id number]  Today's Date: 12/14/2019    Admit Date: 12/13/2019    Discharge Needs Assessment     Row Name 12/14/19 1930       Living Environment    Lives With  alone    Current Living Arrangements  home/apartment/condo 2nd floor apartment with elevator    Primary Care Provided by  self    Provides Primary Care For  no one, unable/limited ability to care for self    Family Caregiver if Needed  none    Quality of Family Relationships  unable to assess    Able to Return to Prior Arrangements  yes       Resource/Environmental Concerns    Resource/Environmental Concerns  none    Transportation Concerns  car, none       Transition Planning    Patient/Family Anticipates Transition to  inpatient rehabilitation facility    Patient/Family Anticipated Services at Transition  rehabilitation services    Transportation Anticipated  family or friend will provide       Discharge Needs Assessment    Readmission Within the Last 30 Days  no previous admission in last 30 days    Concerns to be Addressed  basic needs    Equipment Currently Used at Home  nebulizer;oxygen;commode;glucometer;rollator;grab bar;hospital bed    Anticipated Changes Related to Illness  inability to care for self    Equipment Needed After Discharge  none    Outpatient/Agency/Support Group Needs  inpatient rehabilitation facility    Discharge Facility/Level of Care Needs  rehabilitation facility    Provided post acute provider list?  Refused    Current Discharge Risk  chronically ill;lives alone        Discharge Plan     Row Name 12/14/19 1932       Plan    Plan  Referrals to [facility]-1st choice and [facility]-2nd choice- await evaluations    Patient/Family in Agreement with Plan  yes    Plan Comments  Introduced self and explained role of CCP, IMM checkecd and facesheet verified with patient at bedside.  Patient states her emergency contact is her nephew, Cr  Kallie at [phone] or son, Anjel Colunga at [phone].  Patient states she lives alone in a 2nd floor apartment with access to elevator and is independent with ADLs.  Patient states she currently has oxygen 2L continuous from [place] and a nebulizer, 3-1 commode, glucometer, rollator walker, hospital bed and grab bar in shower and denies any DME needs at discharge.  Patient states her PCP is [facility] and she uses [facility] in [place], [place] and they deliver her medications to her.  Patient states she has used [facility] in the past and has been to [facility] in the past.  Patient would like referrals to [facility] as 1st choice(Cris- left referral on her voicemail at [phone] and [facility]-2nd choice(Erich- left referral on voicemail at [phone])- await evaluations.  Patient states her nephew, Cr will provide transportation for her at discharge.... Jill Vergara RN,[facility]         Destination      Service Provider Request Status Selected Services Address Phone Number Fax Number    [facility] Pending - Request Sent N/A [phone] [id number] [phone] --    [facility] Pending - Request Sent N/A [id number] [id number] [phone]      Durable Medical Equipment      Coordination has not been started for this encounter.      Dialysis/Infusion      Coordination has not been started for this encounter.      Home Medical Care      Coordination has not been started for this encounter.      Therapy      Coordination has not been started for this encounter.      Community Resources      Coordination has not been started for this encounter.          Demographic Summary     Row Name 12/14/19 6735       General Information    Admission Type  inpatient    Arrived From  home    Referral Source  nursing    Reason for Consult  discharge planning    Preferred  Language  English     Used During This Interaction  no       Contact Information    Permission Granted to Share Info With          Functional Status     Row Name 12/14/19 1929       Functional Status    Usual Activity Tolerance  moderate    Current Activity Tolerance  poor       Functional Status, IADL    Medications  independent    Meal Preparation  independent    Housekeeping  independent    Laundry  independent    Shopping  independent       Mental Status    General Appearance WDL  WDL       Mental Status Summary    Recent Changes in Mental Status/Cognitive Functioning  no changes        Psychosocial    No documentation.       Abuse/Neglect    No documentation.       Legal    No documentation.       Substance Abuse    No documentation.       Patient Forms    No documentation.           Jill Vergara RN

## 2019-12-15 NOTE — PROGRESS NOTES
DAILY PROGRESS NOTE  [facility]    Patient Identification:  Name: Vanessa Colunga  Age: 64 y.o.  Sex: female  :  1955  MRN: [id number]         Primary Care Physician: Esha Medley    Subjective: patient is resting; feels short of air but a little better than initially; still with a cough and some congestion; still smokes  Interval History: follow up for pneumonia, copd, chronic diastolic chf, ckd3, chronic hypoxic respiratory failure    Objective:    Scheduled Meds:  allopurinol 100 mg Oral Daily   amLODIPine 10 mg Oral Daily   arformoterol 15 mcg Nebulization BID - RT   aspirin 81 mg Oral Daily   atorvastatin 40 mg Oral Daily   budesonide 1 mg Nebulization BID - RT   calcitonin (salmon) 1 spray Alternating Nares Daily   clonazePAM 0.25 mg Oral BID   clopidogrel 75 mg Oral Daily   docusate sodium 100 mg Oral BID   enoxaparin 40 mg Subcutaneous Q24H   guaifenesin-dextromethorphan 1 tablet Oral BID   hydrocortisone 10 mg Oral BID   influenza vaccine 0.5 mL Intramuscular Once   insulin glargine 20 Units Subcutaneous Nightly   insulin glargine 30 Units Subcutaneous Daily With Breakfast   insulin lispro 0-24 Units Subcutaneous 4x Daily With Meals & Nightly   ipratropium-albuterol 3 mL Nebulization 4x Daily - RT   isosorbide mononitrate 30 mg Oral Daily   levothyroxine 88 mcg Oral Q AM   methylPREDNISolone sodium succinate 60 mg Intravenous Q8H   metoclopramide 5 mg Oral TID AC   piperacillin-tazobactam 3.375 g Intravenous Q8H   sodium chloride 10 mL Intravenous Q12H     Continuous Infusions:     Vital signs in last 24 hours:  Temp:  [97 °F (36.1 °C)-98.2 °F (36.8 °C)] 98.2 °F (36.8 °C)  Heart Rate:  [67-94] 94  Resp:  [16-18] 18  BP: (140-160)/(65-83) 142/65    Intake/Output:    Intake/Output Summary (Last 24 hours) at 2019  Last data filed at 2019  Gross per 24 hour   Intake 1630 ml   Output 800 ml   Net 830 ml       Exam:  /65 (BP Location: Left arm, Patient Position:  "Lying)   Pulse 94   Temp 98.2 °F (36.8 °C) (Oral)   Resp 18   Ht 149.9 cm (59\")   Wt 69.7 kg (153 lb 10.6 oz)   SpO2 99%   BMI 31.04 kg/m²     General Appearance:    Alert, cooperative, no distress, AAOx3; mildly tachypneic at rest                          Head:    Normocephalic, without obvious abnormality, atraumatic                           Eyes:    PERRL, conjunctiva/corneas clear, EOM's intact, both eyes                         Throat:   Lips, tongue, gums normal; oral mucosa pink and moist                           Neck:   Supple, symmetrical, trachea midline, no JVD                         Lungs:    Decreased breath sounds, wheezes and crackles bilaterally, respirations unlabored                 Chest Wall:    No tenderness or deformity                          Heart:    Regular rate and rhythm, S1 and S2 normal, no murmur,no  Rub                                      or gallop                  Abdomen:     Soft, non-tender, bowel sounds active, no masses, no                                                        organomegaly                  Extremities:   Extremities normal, atraumatic, no cyanosis or edema                        Pulses:   Pulses palpable in all extremities                            Skin:   Skin is warm and dry,  no rashes or palpable lesions                  Neurologic:   CNII-XII intact, motor strength grossly intact, sensation grossly                                         intact to light touch, no focal deficits noted       Data Review:  Labs in chart were reviewed.  WBC   Date Value Ref Range Status   12/14/2019 8.09 3.40 - 10.80 10*3/mm3 Final   12/12/2019 12.65 (H) 4.5 - 11.0 10*3/uL Final     Hemoglobin   Date Value Ref Range Status   12/14/2019 11.9 (L) 12.0 - 15.9 g/dL Final   12/12/2019 11.3 (L) 12.0 - 16.0 g/dL Final     Hematocrit   Date Value Ref Range Status   12/14/2019 37.3 34.0 - 46.6 % Final   12/12/2019 34.8 (L) 36.0 - 46.0 % Final     Platelets   Date Value Ref " Range Status   12/14/2019 171 140 - 450 10*3/mm3 Final   12/12/2019 213 140 - 440 10*3/uL Final     Sodium   Date Value Ref Range Status   12/14/2019 132 (L) 136 - 145 mmol/L Final   12/12/2019 133 (L) 137 - 145 mmol/L Final     Potassium   Date Value Ref Range Status   12/14/2019 4.7 3.5 - 5.2 mmol/L Final   12/12/2019 4.4 3.5 - 5.1 mmol/L Final     Chloride   Date Value Ref Range Status   12/14/2019 98 98 - 107 mmol/L Final   12/12/2019 102 98 - 107 mmol/L Final     CO2   Date Value Ref Range Status   12/14/2019 23.2 22.0 - 29.0 mmol/L Final     Total CO2   Date Value Ref Range Status   12/12/2019 23 22 - 30 mmol/L Final     BUN   Date Value Ref Range Status   12/14/2019 39 (H) 8 - 23 mg/dL Final   12/12/2019 62 (H) 7 - 20 mg/dL Final     Creatinine   Date Value Ref Range Status   12/14/2019 0.75 0.57 - 1.00 mg/dL Final   12/12/2019 1.0 0.7 - 1.5 mg/dL Final     Glucose   Date Value Ref Range Status   12/14/2019 290 (H) 65 - 99 mg/dL Final     Calcium   Date Value Ref Range Status   12/14/2019 8.7 8.6 - 10.5 mg/dL Final   12/12/2019 8.6 8.4 - 10.2 mg/dL Final     AST (SGOT)   Date Value Ref Range Status   12/13/2019 59 (H) 1 - 32 U/L Final     ALT (SGPT)   Date Value Ref Range Status   12/13/2019 161 (H) 1 - 33 U/L Final     Alkaline Phosphatase   Date Value Ref Range Status   12/13/2019 89 39 - 117 U/L Final     INR   Date Value Ref Range Status   12/13/2019 0.91 0.90 - 1.10 Final     Patient Active Problem List   Diagnosis Code   • Pneumonia of both upper lobes due to infectious organism (CMS/[facility]) J18.1   • Chronic diastolic CHF (congestive heart failure) (CMS/[facility]) I50.32   • Type 2 diabetes mellitus with circulatory disorder, with long-term current use of insulin (CMS/[facility]) E11.59, Z79.4   • Essential hypertension I10   • Chronic respiratory failure with hypoxia and hypercapnia (CMS/HCC) J96.11, J96.12   • DNR (do not resuscitate) Z66   • History of ESBL E. coli infection Z86.19   • [place]'s disease (CMS/[facility])  E27.1   • Chronic kidney disease, stage III (moderate) (CMS/HCC) N18.3   • Hypothyroidism (acquired) E03.9   • Gastroparesis diabeticorum (CMS/HCC) E11.43, K31.84   • Paroxysmal atrial fibrillation (CMS/[facility]) I48.0   • Coronary artery disease involving native coronary artery without angina pectoris I25.10   • COPD with acute exacerbation (CMS/[facility]) J44.1       Assessment:    Pneumonia of both upper lobes due to infectious organism (CMS/[facility])    Chronic diastolic CHF (congestive heart failure) (CMS/[facility])    Type 2 diabetes mellitus with circulatory disorder, with long-term current use of insulin (CMS/[facility])    Essential hypertension    Chronic respiratory failure with hypoxia and hypercapnia (CMS/[facility])    DNR (do not resuscitate)    History of ESBL E. coli infection    [place]'s disease (CMS/[facility])    Chronic kidney disease, stage III (moderate) (CMS/HCC)    Hypothyroidism (acquired)    Gastroparesis diabeticorum (CMS/[facility])    Paroxysmal atrial fibrillation (CMS/[facility])    Coronary artery disease involving native coronary artery without angina pectoris    COPD with acute exacerbation (CMS/[facility])  hyponatremia    Plan:  Will continue antibiotics and steroids  Blood sugar is high due to steroids  She is on home oxygen  Still with some wheezes and crackles  Monitor electrolytes  D.w patient that she may take some time to improve due to underlying lung disease  Encouraged smoking cessation  Medium risk  Shanice Magana MD  12/14/2019  9:23 PM

## 2019-12-16 LAB
ANION GAP SERPL CALCULATED.3IONS-SCNC: 9.6 MMOL/L (ref 5–15)
BASOPHILS # BLD AUTO: 0.02 10*3/MM3 (ref 0–0.2)
BASOPHILS NFR BLD AUTO: 0.2 % (ref 0–1.5)
BUN BLD-MCNC: 40 MG/DL (ref 8–23)
BUN/CREAT SERPL: 46 (ref 7–25)
CALCIUM SPEC-SCNC: 8.9 MG/DL (ref 8.6–10.5)
CHLORIDE SERPL-SCNC: 103 MMOL/L (ref 98–107)
CO2 SERPL-SCNC: 26.4 MMOL/L (ref 22–29)
CREAT BLD-MCNC: 0.87 MG/DL (ref 0.57–1)
DEPRECATED RDW RBC AUTO: 51.4 FL (ref 37–54)
EOSINOPHIL # BLD AUTO: 0 10*3/MM3 (ref 0–0.4)
EOSINOPHIL NFR BLD AUTO: 0 % (ref 0.3–6.2)
ERYTHROCYTE [DISTWIDTH] IN BLOOD BY AUTOMATED COUNT: 15.5 % (ref 12.3–15.4)
GFR SERPL CREATININE-BSD FRML MDRD: 65 ML/MIN/1.73
GLUCOSE BLD-MCNC: 145 MG/DL (ref 65–99)
GLUCOSE BLDC GLUCOMTR-MCNC: 114 MG/DL (ref 70–130)
GLUCOSE BLDC GLUCOMTR-MCNC: 193 MG/DL (ref 70–130)
GLUCOSE BLDC GLUCOMTR-MCNC: 193 MG/DL (ref 70–130)
GLUCOSE BLDC GLUCOMTR-MCNC: 296 MG/DL (ref 70–130)
HCT VFR BLD AUTO: 30.7 % (ref 34–46.6)
HGB BLD-MCNC: 10.1 G/DL (ref 12–15.9)
IMM GRANULOCYTES # BLD AUTO: 0.15 10*3/MM3 (ref 0–0.05)
IMM GRANULOCYTES NFR BLD AUTO: 1.2 % (ref 0–0.5)
LYMPHOCYTES # BLD AUTO: 0.36 10*3/MM3 (ref 0.7–3.1)
LYMPHOCYTES NFR BLD AUTO: 2.9 % (ref 19.6–45.3)
MCH RBC QN AUTO: 30.3 PG (ref 26.6–33)
MCHC RBC AUTO-ENTMCNC: 32.9 G/DL (ref 31.5–35.7)
MCV RBC AUTO: 92.2 FL (ref 79–97)
MONOCYTES # BLD AUTO: 0.37 10*3/MM3 (ref 0.1–0.9)
MONOCYTES NFR BLD AUTO: 2.9 % (ref 5–12)
NEUTROPHILS # BLD AUTO: 11.68 10*3/MM3 (ref 1.7–7)
NEUTROPHILS NFR BLD AUTO: 92.8 % (ref 42.7–76)
NRBC BLD AUTO-RTO: 0.1 /100 WBC (ref 0–0.2)
PLATELET # BLD AUTO: 141 10*3/MM3 (ref 140–450)
PMV BLD AUTO: 11.2 FL (ref 6–12)
POTASSIUM BLD-SCNC: 4.3 MMOL/L (ref 3.5–5.2)
RBC # BLD AUTO: 3.33 10*6/MM3 (ref 3.77–5.28)
SODIUM BLD-SCNC: 139 MMOL/L (ref 136–145)
WBC NRBC COR # BLD: 12.58 10*3/MM3 (ref 3.4–10.8)

## 2019-12-16 PROCEDURE — 80048 BASIC METABOLIC PNL TOTAL CA: CPT | Performed by: INTERNAL MEDICINE

## 2019-12-16 PROCEDURE — 85025 COMPLETE CBC W/AUTO DIFF WBC: CPT | Performed by: INTERNAL MEDICINE

## 2019-12-16 PROCEDURE — 94799 UNLISTED PULMONARY SVC/PX: CPT

## 2019-12-16 PROCEDURE — 25010000002 PIPERACILLIN SOD-TAZOBACTAM PER 1 G: Performed by: INTERNAL MEDICINE

## 2019-12-16 PROCEDURE — 90686 IIV4 VACC NO PRSV 0.5 ML IM: CPT | Performed by: INTERNAL MEDICINE

## 2019-12-16 PROCEDURE — 25010000002 METHYLPREDNISOLONE PER 125 MG: Performed by: INTERNAL MEDICINE

## 2019-12-16 PROCEDURE — 82962 GLUCOSE BLOOD TEST: CPT

## 2019-12-16 PROCEDURE — 25010000002 ENOXAPARIN PER 10 MG: Performed by: INTERNAL MEDICINE

## 2019-12-16 PROCEDURE — G0008 ADMIN INFLUENZA VIRUS VAC: HCPCS | Performed by: INTERNAL MEDICINE

## 2019-12-16 PROCEDURE — 25010000002 ONDANSETRON PER 1 MG: Performed by: INTERNAL MEDICINE

## 2019-12-16 PROCEDURE — 25010000002 METHYLPREDNISOLONE PER 40 MG: Performed by: INTERNAL MEDICINE

## 2019-12-16 PROCEDURE — 63710000001 INSULIN GLARGINE PER 5 UNITS: Performed by: INTERNAL MEDICINE

## 2019-12-16 PROCEDURE — 25010000002 INFLUENZA VAC SPLIT QUAD 0.5 ML SUSPENSION PREFILLED SYRINGE: Performed by: INTERNAL MEDICINE

## 2019-12-16 PROCEDURE — 63710000001 INSULIN LISPRO (HUMAN) PER 5 UNITS: Performed by: INTERNAL MEDICINE

## 2019-12-16 RX ORDER — FUROSEMIDE 40 MG/1
40 TABLET ORAL DAILY
Status: DISCONTINUED | OUTPATIENT
Start: 2019-12-16 | End: 2019-12-21 | Stop reason: HOSPADM

## 2019-12-16 RX ORDER — AZITHROMYCIN 250 MG/1
250 TABLET, FILM COATED ORAL
Status: DISCONTINUED | OUTPATIENT
Start: 2019-12-16 | End: 2019-12-18

## 2019-12-16 RX ORDER — ALBUTEROL SULFATE 2.5 MG/3ML
2.5 SOLUTION RESPIRATORY (INHALATION) EVERY 6 HOURS PRN
Status: DISCONTINUED | OUTPATIENT
Start: 2019-12-16 | End: 2019-12-21 | Stop reason: HOSPADM

## 2019-12-16 RX ORDER — METHYLPREDNISOLONE SODIUM SUCCINATE 40 MG/ML
40 INJECTION, POWDER, LYOPHILIZED, FOR SOLUTION INTRAMUSCULAR; INTRAVENOUS EVERY 8 HOURS
Status: DISCONTINUED | OUTPATIENT
Start: 2019-12-16 | End: 2019-12-17

## 2019-12-16 RX ADMIN — HYDROCORTISONE 10 MG: 10 TABLET ORAL at 08:30

## 2019-12-16 RX ADMIN — HYDROCODONE BITARTRATE AND ACETAMINOPHEN 1 TABLET: 7.5; 325 TABLET ORAL at 08:30

## 2019-12-16 RX ADMIN — FUROSEMIDE 40 MG: 40 TABLET ORAL at 17:34

## 2019-12-16 RX ADMIN — TAZOBACTAM SODIUM AND PIPERACILLIN SODIUM 3.38 G: 375; 3 INJECTION, SOLUTION INTRAVENOUS at 02:38

## 2019-12-16 RX ADMIN — METHYLPREDNISOLONE SODIUM SUCCINATE 60 MG: 125 INJECTION, POWDER, FOR SOLUTION INTRAMUSCULAR; INTRAVENOUS at 05:55

## 2019-12-16 RX ADMIN — AZITHROMYCIN DIHYDRATE 250 MG: 250 TABLET, FILM COATED ORAL at 17:34

## 2019-12-16 RX ADMIN — METOCLOPRAMIDE 5 MG: 5 TABLET ORAL at 17:34

## 2019-12-16 RX ADMIN — INSULIN LISPRO 12 UNITS: 100 INJECTION, SOLUTION INTRAVENOUS; SUBCUTANEOUS at 17:34

## 2019-12-16 RX ADMIN — ATORVASTATIN CALCIUM 40 MG: 20 TABLET, FILM COATED ORAL at 08:30

## 2019-12-16 RX ADMIN — INSULIN LISPRO 4 UNITS: 100 INJECTION, SOLUTION INTRAVENOUS; SUBCUTANEOUS at 21:23

## 2019-12-16 RX ADMIN — ISOSORBIDE MONONITRATE 30 MG: 30 TABLET ORAL at 08:30

## 2019-12-16 RX ADMIN — SODIUM CHLORIDE, PRESERVATIVE FREE 10 ML: 5 INJECTION INTRAVENOUS at 21:26

## 2019-12-16 RX ADMIN — AMLODIPINE BESYLATE 10 MG: 10 TABLET ORAL at 08:30

## 2019-12-16 RX ADMIN — DOCUSATE SODIUM 100 MG: 100 CAPSULE, LIQUID FILLED ORAL at 08:31

## 2019-12-16 RX ADMIN — METOCLOPRAMIDE 5 MG: 5 TABLET ORAL at 06:30

## 2019-12-16 RX ADMIN — IPRATROPIUM BROMIDE AND ALBUTEROL SULFATE 3 ML: 2.5; .5 SOLUTION RESPIRATORY (INHALATION) at 10:42

## 2019-12-16 RX ADMIN — DOCUSATE SODIUM 100 MG: 100 CAPSULE, LIQUID FILLED ORAL at 21:25

## 2019-12-16 RX ADMIN — TAZOBACTAM SODIUM AND PIPERACILLIN SODIUM 3.38 G: 375; 3 INJECTION, SOLUTION INTRAVENOUS at 12:50

## 2019-12-16 RX ADMIN — INFLUENZA VIRUS VACCINE 0.5 ML: 15; 15; 15; 15 SUSPENSION INTRAMUSCULAR at 16:13

## 2019-12-16 RX ADMIN — LEVOTHYROXINE SODIUM 88 MCG: 88 TABLET ORAL at 06:30

## 2019-12-16 RX ADMIN — ENOXAPARIN SODIUM 40 MG: 40 INJECTION SUBCUTANEOUS at 21:25

## 2019-12-16 RX ADMIN — ARFORMOTEROL TARTRATE 15 MCG: 15 SOLUTION RESPIRATORY (INHALATION) at 06:47

## 2019-12-16 RX ADMIN — CALCITONIN SALMON 1 SPRAY: 200 SPRAY, METERED NASAL at 09:28

## 2019-12-16 RX ADMIN — INSULIN GLARGINE 20 UNITS: 100 INJECTION, SOLUTION SUBCUTANEOUS at 21:23

## 2019-12-16 RX ADMIN — TRAMADOL HYDROCHLORIDE 50 MG: 50 TABLET, FILM COATED ORAL at 12:50

## 2019-12-16 RX ADMIN — ARFORMOTEROL TARTRATE 15 MCG: 15 SOLUTION RESPIRATORY (INHALATION) at 23:10

## 2019-12-16 RX ADMIN — ALLOPURINOL 100 MG: 100 TABLET ORAL at 08:30

## 2019-12-16 RX ADMIN — GUAIFENESIN AND DEXTROMETHORPHAN HYDROBROMIDE 1 TABLET: 600; 30 TABLET, EXTENDED RELEASE ORAL at 08:30

## 2019-12-16 RX ADMIN — METHYLPREDNISOLONE SODIUM SUCCINATE 40 MG: 40 INJECTION, POWDER, LYOPHILIZED, FOR SOLUTION INTRAMUSCULAR; INTRAVENOUS at 21:27

## 2019-12-16 RX ADMIN — TRAMADOL HYDROCHLORIDE 50 MG: 50 TABLET, FILM COATED ORAL at 21:25

## 2019-12-16 RX ADMIN — IPRATROPIUM BROMIDE AND ALBUTEROL SULFATE 3 ML: 2.5; .5 SOLUTION RESPIRATORY (INHALATION) at 14:56

## 2019-12-16 RX ADMIN — ONDANSETRON 4 MG: 2 INJECTION INTRAMUSCULAR; INTRAVENOUS at 12:59

## 2019-12-16 RX ADMIN — ASPIRIN 81 MG: 81 TABLET, CHEWABLE ORAL at 08:30

## 2019-12-16 RX ADMIN — METOCLOPRAMIDE 5 MG: 5 TABLET ORAL at 12:50

## 2019-12-16 RX ADMIN — HYDROCORTISONE 10 MG: 10 TABLET ORAL at 21:25

## 2019-12-16 RX ADMIN — INSULIN GLARGINE 30 UNITS: 100 INJECTION, SOLUTION SUBCUTANEOUS at 08:28

## 2019-12-16 RX ADMIN — METHYLPREDNISOLONE SODIUM SUCCINATE 60 MG: 125 INJECTION, POWDER, FOR SOLUTION INTRAMUSCULAR; INTRAVENOUS at 12:49

## 2019-12-16 RX ADMIN — GUAIFENESIN AND DEXTROMETHORPHAN HYDROBROMIDE 1 TABLET: 600; 30 TABLET, EXTENDED RELEASE ORAL at 21:25

## 2019-12-16 RX ADMIN — INSULIN LISPRO 4 UNITS: 100 INJECTION, SOLUTION INTRAVENOUS; SUBCUTANEOUS at 12:49

## 2019-12-16 RX ADMIN — CLONAZEPAM 0.25 MG: 0.5 TABLET ORAL at 08:30

## 2019-12-16 RX ADMIN — SODIUM CHLORIDE, PRESERVATIVE FREE 10 ML: 5 INJECTION INTRAVENOUS at 08:31

## 2019-12-16 RX ADMIN — CLONAZEPAM 0.25 MG: 0.5 TABLET ORAL at 21:25

## 2019-12-16 RX ADMIN — CLOPIDOGREL 75 MG: 75 TABLET, FILM COATED ORAL at 08:30

## 2019-12-16 NOTE — PROGRESS NOTES
Continued Stay Note  [facility]     Patient Name: Vanessa Colunga  MRN: [id number]  Today's Date: 12/16/2019    Admit Date: 12/13/2019    Discharge Plan     Row Name 12/16/19 1210       Plan    Plan  [facility]    Patient/Family in Agreement with Plan  yes    Plan Comments  Inbound call from Amy (daughter in law), explained [facility] has a Medicaid bed and has accepted patient. Family agreeable to patient going to [facility] and would like patient to be followed by [facility] once she transitioned to LTC at [facility]. [facility] with the family's wishes. Family can transport at DC. Ana Maria Rice RN    Row Name 12/16/19 1201       Plan    Plan  [facility] vs. [facility]    Patient/Family in Agreement with Plan  yes    Plan Comments  Inbound VM from [facility] at [facility]. Patient has been accepted under Medicaid as she has used all her Medicare days. Messages left for nephew and son to discuss DC plan. Awaiting return call. Ana Maria Rice RN        Discharge Codes    No documentation.             Ana Maria Rice RN

## 2019-12-16 NOTE — PLAN OF CARE
Problem: Patient Care Overview  Goal: Plan of Care Review  Outcome: Ongoing (interventions implemented as appropriate)  Flowsheets  Taken 12/15/2019 0905 by Ganga Reyes PTA  Progress: improving  Taken 2019 1628 by Rachna Fuller RN  Plan of Care Reviewed With: patient  Outcome Summary: Pt balanced rest with activity throughout day. C/O pain, PO pain medication admin with some relief noted. Denies SOA except with activity, V/D. C/O nausea, medicated. Pulm consulted, lasix and PO abx ordered. No acute s/s of distress noted. Monitored closely  Goal: Individualization and Mutuality  Outcome: Ongoing (interventions implemented as appropriate)  Goal: Discharge Needs Assessment  Outcome: Ongoing (interventions implemented as appropriate)  Goal: Interprofessional Rounds/Family Conf  Outcome: Ongoing (interventions implemented as appropriate)     Problem: Pain, Chronic (Adult)  Goal: Acceptable Pain/Comfort Level and Functional Ability  Outcome: Ongoing (interventions implemented as appropriate)     Problem: Fall Risk (Adult)  Goal: Absence of Fall  Outcome: Ongoing (interventions implemented as appropriate)     Problem: Skin Injury Risk (Adult)  Goal: Skin Health and Integrity  Outcome: Ongoing (interventions implemented as appropriate)     Problem: Chronic Obstructive Pulmonary Disease (Adult)  Description  Prevent and manage potential problems includin. functional deficit  2. infection  3. respiratory compromise  4. situational response  5. undernutrition  Goal: Signs and Symptoms of Listed Potential Problems Will be Absent, Minimized or Managed (Chronic Obstructive Pulmonary Disease)  Outcome: Ongoing (interventions implemented as appropriate)

## 2019-12-16 NOTE — PLAN OF CARE
Problem: Patient Care Overview  Goal: Plan of Care Review  Outcome: Ongoing (interventions implemented as appropriate)  Flowsheets  Taken 12/15/2019 0905 by Ganga Reyes PTA  Progress: improving  Taken 12/16/2019 0351 by Clint Pavon RN  Plan of Care Reviewed With: patient  Outcome Summary: O2 2L NC.  Purewick to wall suction.  C/O low back pain.  Medicated per MAR.

## 2019-12-16 NOTE — CONSULTS
Met with patient and son to explain hospice services. Patient and nephew expressed that going home really isn't an option, as patient has gotten weaker, having trouble walking, and needing more assistance with ADLs. Spoke with [facility] who states she has made referrals to facilities. Per our notes patient has Medicaid, and we can follow at facility as long as patient is not going skilled, or we can come on board once she finishes therapy. We will continue to follow for next steps. Thank you for the referral, and for allowing us to participate in the care of this patient.     Ria Steele, RENE  [facility]  [phone]

## 2019-12-16 NOTE — PROGRESS NOTES
Continued Stay Note  [facility]     Patient Name: Vanessa Colunga  MRN: [id number]  Today's Date: 12/16/2019    Admit Date: 12/13/2019    Discharge Plan     Row Name 12/16/19 1201       Plan    Plan  [place] vs. [facility]    Patient/Family in Agreement with Plan  yes    Plan Comments  Inbound VM from [facility] at [place]. Patient has been accepted under Medicaid as she has used all her Medicare days. Messages left for nephew and son to discuss DC plan. Awaiting return call. Ana Maria Rice RN        Discharge Codes    No documentation.             Ana Maria Rice RN

## 2019-12-16 NOTE — PROGRESS NOTES
Name: Vanessa Colunga ADMIT: 2019   : 1955  PCP: Esha Medley S    MRN: [id number] LOS: 3 days   AGE/SEX: 64 y.o. female  ROOM: [facility]   Subjective   She still reports shortness of breath and cough which is more productive now.  Still very weak.  No chest pain or palpitations reported.  No nausea or vomiting.    Objective   Vital Signs  Temp:  [97.1 °F (36.2 °C)-98 °F (36.7 °C)] 97.5 °F (36.4 °C)  Heart Rate:  [] 85  Resp:  [16-20] 16  BP: (137-168)/(67-88) 142/67  SpO2:  [97 %-100 %] 100 %  on  Flow (L/min):  [2] 2;   Device (Oxygen Therapy): nasal cannula  Body mass index is 32.17 kg/m².    Physical Exam   Constitutional: She appears well-developed. No distress.   HENT:   Head: Atraumatic.   Nose: Nose normal.   Eyes: Conjunctivae and EOM are normal.   Neck: Neck supple. No tracheal deviation present.   Cardiovascular: Normal rate, regular rhythm and intact distal pulses.   Pulmonary/Chest: Effort normal. She has wheezes. She has rales.   Abdominal: Soft. She exhibits no distension. There is no tenderness. There is no rebound and no guarding.   Musculoskeletal: She exhibits no edema or tenderness.   Neurological: She is alert. No cranial nerve deficit.   Skin: Skin is warm and dry. She is not diaphoretic.   Psychiatric: She has a normal mood and affect. Her behavior is normal.   Nursing note and vitals reviewed.      Results Review:       I reviewed the patient's new clinical results.     I reviewed imaging, agree with interpretation.     I reviewed telemetry/EKG results, sinus.     I reviewed prior records    Results from last 7 days   Lab Units 12/16/19  0424 12/15/19  0555 12/14/19  0712 19  1236   WBC 10*3/mm3 12.58* 13.04* 8.09 16.76*   HEMOGLOBIN g/dL 10.1* 10.3* 11.9* 12.7   PLATELETS 10*3/mm3 141 163 171 210     Results from last 7 days   Lab Units 194 12/15/19  0555 19  0712 19  1236   SODIUM mmol/L 139 139 132* 140   POTASSIUM mmol/L 4.3 4.3 4.7 3.9    CHLORIDE mmol/L 103 102 98 104   CO2 mmol/L 26.4 25.0 23.2 25.1   BUN mg/dL 40* 39* 39* 42*   CREATININE mg/dL 0.87 1.05* 0.75 0.79   GLUCOSE mg/dL 145* 195* 290* 96   Estimated Creatinine Clearance: 58.4 mL/min (by C-G formula based on SCr of 0.87 mg/dL).  Results from last 7 days   Lab Units 12/16/19  0424 12/15/19  0555 12/14/19  0712 12/13/19  1236   CALCIUM mg/dL 8.9 8.5* 8.7 9.2   ALBUMIN g/dL  --   --   --  3.40*         allopurinol 100 mg Oral Daily   amLODIPine 10 mg Oral Daily   arformoterol 15 mcg Nebulization BID - RT   aspirin 81 mg Oral Daily   atorvastatin 40 mg Oral Daily   budesonide 1 mg Nebulization BID - RT   calcitonin (salmon) 1 spray Alternating Nares Daily   clonazePAM 0.25 mg Oral BID   clopidogrel 75 mg Oral Daily   docusate sodium 100 mg Oral BID   enoxaparin 40 mg Subcutaneous Q24H   guaifenesin-dextromethorphan 1 tablet Oral BID   hydrocortisone 10 mg Oral BID   influenza vaccine 0.5 mL Intramuscular Once   insulin glargine 20 Units Subcutaneous Nightly   insulin glargine 30 Units Subcutaneous Daily With Breakfast   insulin lispro 0-24 Units Subcutaneous 4x Daily With Meals & Nightly   ipratropium-albuterol 3 mL Nebulization 4x Daily - RT   isosorbide mononitrate 30 mg Oral Daily   levothyroxine 88 mcg Oral Q AM   methylPREDNISolone sodium succinate 60 mg Intravenous Q8H   metoclopramide 5 mg Oral TID AC   piperacillin-tazobactam 3.375 g Intravenous Q8H   sodium chloride 10 mL Intravenous Q12H      Diet Regular; Cardiac, Consistent Carbohydrate      Assessment/Plan      Active Hospital Problems    Diagnosis  POA   • Pneumonia of both upper lobes due to infectious organism (CMS/HCC) [J18.1]  Yes   • Chronic diastolic CHF (congestive heart failure) (CMS/HCC) [I50.32]  Yes   • Type 2 diabetes mellitus with circulatory disorder, with long-term current use of insulin (CMS/HCC) [E11.59, Z79.4]  Not Applicable   • Essential hypertension [I10]  Yes   • Chronic respiratory failure with hypoxia  and hypercapnia (CMS/[facility]) [J96.11, J96.12]  Yes   • DNR (do not resuscitate) [Z66]  Yes   • History of ESBL E. coli infection [Z86.19]  Yes   • [place]'s disease (CMS/[facility]) [E27.1]  Yes   • Chronic kidney disease, stage III (moderate) (CMS/[facility]) [N18.3]  Yes   • Hypothyroidism (acquired) [E03.9]  Yes   • Gastroparesis diabeticorum (CMS/[facility]) [E11.43, K31.84]  Yes   • Paroxysmal atrial fibrillation (CMS/[facility]) [I48.0]  Yes   • Coronary artery disease involving native coronary artery without angina pectoris [I25.10]  Yes   • COPD with acute exacerbation (CMS/[facility]) [J44.1]  Yes      Resolved Hospital Problems   No resolved problems to display.       · COPD exacerbation/pneumonia/acute chronic hypoxic respiratory failure: She still has significant wheezing and crackles on exam.  Is on 2 L of oxygen.  Procalcitonin was negative and cultures are no growth to date.  Urinary antigens negative.  Respiratory viral panel is negative as well.  She just recently had a bronchoscopy at [place]. Given her lack of improvement will ask pulmonology to evaluate.  Continue IV steroids and breathing treatments.  · Diabetes: Continue insulin  · CKD 3: Stable  · Gastroparesis: She does report intermittent regurgitation but no nausea currently.  · [place]'s: On [place]  · PAF: Sinus on my exam  · Chronic diastolic heart failure  · Disposition: TBD    Imer Plascencia MD  [facility]  12/16/19  2:08 PM    Dictated portions using Dragon dictation software.

## 2019-12-16 NOTE — CONSULTS
Group: [facility]         CONSULT NOTE    Patient Identification:  Vanessa Colunga  64 y.o.  female  1955  [id number]            Requesting physician: Hospitalist Dr. Plascencia    Reason for Consultation: COPD exacerbation/pneumonia    CC:     History of Present Illness:  64-year-old female with known history of COPD oxygen dependent at baseline 2 L oxygen nasal cannula chronic kidney disease diastolic CHF and pretty much a lifelong smoker.  Patient admitted to [facility] to the hospitalist service with worsening dyspnea and nonproductive cough for the last 3 days.  She was earlier hospitalized at [facility] for COPD exacerbation.  In the hospital despite getting steroids bronchodilators and antibiotics she has not made much improvement.  She is currently at 2 L oxygen nasal cannula.  She tells me presently that she has cough and congestion ongoing.  She is also still actively wheezing.  She did have chest pain on admission and with cough.      Review of Systems  Constitutional: Negative for chills and fever.   HENT: Negative for congestion, nosebleeds, sore throat and trouble swallowing.    Eyes: Negative for redness and visual disturbance.   Respiratory: Positive for cough, shortness of breath and wheezing. Negative for choking.    Cardiovascular: Positive for chest pain (sternal, with cough). Negative for palpitations and leg swelling.   Gastrointestinal: Negative for abdominal pain, constipation, diarrhea, nausea and vomiting.   Endocrine: Negative for cold intolerance and heat intolerance.   Genitourinary: Negative for difficulty urinating, dysuria, flank pain, frequency and hematuria.   Musculoskeletal: Negative for arthralgias and myalgias.   Skin: Negative for pallor and rash.   Neurological: Positive for weakness (generlized). Negative for dizziness, light-headedness and headaches.   Hematological: Negative for adenopathy. Bruises/bleeds easily.   Psychiatric/Behavioral: Negative  for confusion and decreased concentration.   Past Medical History:  Past Medical History:   Diagnosis Date   • CHF (congestive heart failure) (CMS/[facility])    • COPD (chronic obstructive pulmonary disease) (CMS/[facility])    • Diabetes mellitus (CMS/[facility])    • Hypertension        Past Surgical History:  Past Surgical History:   Procedure Laterality Date   • CHOLECYSTECTOMY     • CORONARY ANGIOPLASTY WITH STENT PLACEMENT      3 stents in 2007 (approximate)        Home Meds:  Medications Prior to Admission   Medication Sig Dispense Refill Last Dose   • allopurinol (ZYLOPRIM) 100 MG tablet Take 100 mg by mouth Daily.   12/12/2019 at 0900   • amLODIPine (NORVASC) 10 MG tablet Take 10 mg by mouth Daily.   12/12/2019 at 0900   • aspirin 81 MG chewable tablet Chew 81 mg Daily.   12/13/2019 at 0900   • atorvastatin (LIPITOR) 40 MG tablet Take 40 mg by mouth Daily.   12/12/2019 at 2100   • budesonide-formoterol (SYMBICORT) 160-4.5 MCG/ACT inhaler Inhale 2 puffs 2 (Two) Times a Day.   12/12/2019 at Unknown time   • calcitonin, salmon, (MIACALCIN) 200 UNIT/ACT nasal spray 1 spray by Alternating Nares route Daily.   12/12/2019 at Unknown time   • clonazePAM (KlonoPIN) 0.5 MG tablet Take 0.25 mg by mouth 2 (Two) Times a Day.   12/12/2019 at 2100   • clopidogrel (PLAVIX) 75 MG tablet Take 75 mg by mouth Daily.   12/12/2019 at 0900   • docusate sodium (COLACE) 100 MG capsule Take 100 mg by mouth 2 (Two) Times a Day.   12/12/2019 at Unknown time   • HYDROcodone-acetaminophen (NORCO) 7.5-325 MG per tablet Take 1 tablet by mouth Every 6 (Six) Hours As Needed for Moderate Pain . Pt states max daily amount of 4 tablets - takes it daily.   12/12/2019 at 2100   • hydrocortisone (CORTEF) 10 MG tablet Take 10 mg by mouth 2 (Two) Times a Day.   12/12/2019 at Unknown time   • Insulin Glargine (LANTUS SOLOSTAR) 100 UNIT/ML injection pen Inject 30 Units under the skin into the appropriate area as directed Daily With Breakfast.   Patient Taking  Differently at 0900   • insulin glargine (LANTUS) 100 UNIT/ML injection Inject 20 Units under the skin into the appropriate area as directed Every Night.   12/12/2019 at 2100   • insulin lispro (HUMALOG) 100 UNIT/ML injection Inject  under the skin into the appropriate area as directed 3 (Three) Times a Day Before Meals. Sliding scale dosage. 140-169 (2 units), 170-199 (4 units), 200-249 (6 units),250-299 (8 units),300-349 (10 units),350-400 (11 units)   12/12/2019 at Unknown time   • ipratropium-albuterol (DUO-NEB) 0.5-2.5 mg/3 ml nebulizer Take 3 mL by nebulization Every 4 (Four) Hours As Needed.   12/12/2019 at Unknown time   • isosorbide mononitrate (IMDUR) 30 MG 24 hr tablet Take 30 mg by mouth Daily.   12/12/2019 at 0900   • levothyroxine (SYNTHROID, LEVOTHROID) 88 MCG tablet Take 88 mcg by mouth Every Morning Before Breakfast.   12/12/2019 at Unknown time   • metoclopramide (REGLAN) 5 MG tablet Take 5 mg by mouth 3 (Three) Times a Day Before Meals.   12/12/2019 at Unknown time   • traMADol (ULTRAM) 50 MG tablet Take 50 mg by mouth 3 (Three) Times a Day As Needed for Moderate Pain .   12/12/2019 at 0800   • vitamin D (ERGOCALCIFEROL) 1.25 MG (83311 UT) capsule capsule Take 50,000 Units by mouth 1 (One) Time Per Week. Pt takes on Tuesdays.   12/10/2019 at Unknown time       Allergies:  No Known Allergies    Social History:   Social History     Socioeconomic History   • Marital status:      Spouse name: Not on file   • Number of children: Not on file   • Years of education: Not on file   • Highest education level: Not on file   Tobacco Use   • Smoking status: Current Some Day Smoker   • Smokeless tobacco: Never Used   Substance and Sexual Activity   • Alcohol use: Not Currently   • Drug use: Never   • Sexual activity: Defer       Family History:  History reviewed. No pertinent family history.    Physical Exam:  /67 (BP Location: Left arm, Patient Position: Lying)   Pulse 85   Temp 97.5 °F (36.4  "°C) (Oral)   Resp 16   Ht 149.9 cm (59\")   Wt 72.3 kg (159 lb 4.8 oz)   SpO2 100%   BMI 32.17 kg/m²  Body mass index is 32.17 kg/m². 100% 72.3 kg (159 lb 4.8 oz)  Physical Exam  Middle-age female resting company no distress no labored breathing  Well-developed obese body habitus  Eyes normal conjunctive a pupils reactive to light  ENT Mallampati 4 with normal nasal exam  Neck midline trachea no thyromegaly  Chest diminished breath sound diffuse wheezing bilaterally no labored breathing currently  CVs regular rate and rhythm no lower extremity edema  Abdomen soft obese nontender no hepatosplenomegaly  CNS intact normal sensory exam  Skin no rashes no nodules  Psych oriented to time place and person normal memory  Musculoskeletal no cyanosis no clubbing normal range of motion    LABS:  Lab Results   Component Value Date    CALCIUM 8.9 12/16/2019     Results from last 7 days   Lab Units 12/16/19  0424 12/15/19  0555 12/14/19  0712 12/13/19  1614 12/13/19  1236   SODIUM mmol/L 139 139 132*  --  140   POTASSIUM mmol/L 4.3 4.3 4.7  --  3.9   CHLORIDE mmol/L 103 102 98  --  104   CO2 mmol/L 26.4 25.0 23.2  --  25.1   BUN mg/dL 40* 39* 39*  --  42*   CREATININE mg/dL 0.87 1.05* 0.75  --  0.79   GLUCOSE mg/dL 145* 195* 290*  --  96   CALCIUM mg/dL 8.9 8.5* 8.7  --  9.2   WBC 10*3/mm3 12.58* 13.04* 8.09  --  16.76*   HEMOGLOBIN g/dL 10.1* 10.3* 11.9*  --  12.7   PLATELETS 10*3/mm3 141 163 171  --  210   ALT (SGPT) U/L  --   --   --   --  161*   AST (SGOT) U/L  --   --   --   --  59*   PROBNP pg/mL  --   --   --   --  1,022.0*   PROCALCITONIN ng/mL  --   --   --  0.12  --      Lab Results   Component Value Date    TROPONINI 0.019 12/04/2019    TROPONINT <0.010 12/13/2019     Results from last 7 days   Lab Units 12/13/19  2228 12/13/19  1614 12/13/19  1236   TROPONIN T ng/mL <0.010 <0.010 <0.010     Results from last 7 days   Lab Units 12/13/19  1830 12/13/19  1815   BLOODCX  No growth at 2 days No growth at 2 days "     Results from last 7 days   Lab Units 12/13/19  1830 12/13/19  1614   PROCALCITONIN ng/mL  --  0.12   LACTATE mmol/L 1.0  --          Results from last 7 days   Lab Units 12/13/19  1303   ADENOVIRUS DETECTION BY PCR  Not Detected   CORONAVIRUS 229E  Not Detected   CORONAVIRUS HKU1  Not Detected   CORONAVIRUS NL63  Not Detected   CORONAVIRUS OC43  Not Detected   HUMAN METAPNEUMOVIRUS  Not Detected   HUMAN RHINOVIRUS/ENTEROVIRUS  Not Detected   INFLUENZA B PCR  Not Detected   PARAINFLUENZA 1  Not Detected   PARAINFLUENZA VIRUS 2  Not Detected   PARAINFLUENZA VIRUS 3  Not Detected   PARAINFLUENZA VIRUS 4  Not Detected   BORDETELLA PERTUSSIS PCR  Not Detected   CHLAMYDOPHILA PNEUMONIAE PCR  Not Detected   MYCOPLAMA PNEUMO PCR  Not Detected   INFLUENZA A PCR  Not Detected   INFLUENZA A H3  Not Detected   INFLUENZA A H1  Not Detected   RSV, PCR  Not Detected     Results from last 7 days   Lab Units 12/13/19  1303   INR  0.91     Results from last 7 days   Lab Units 12/13/19  1830 12/13/19  1815   BLOODCX  No growth at 2 days No growth at 2 days     Lab Results   Component Value Date    TSH 1.280 10/17/2019     Estimated Creatinine Clearance: 58.4 mL/min (by C-G formula based on SCr of 0.87 mg/dL).         Imaging: I personally visualized the images of scans/x-rays performed within last 3 days.      Assessment:  Acute exacerbation of COPD  Questionable pneumonia/interstitial changes  Chronic respiratory failure  Tobacco abuse  Diastolic CHF  Obesity      Recommendations:  At this point we have a middle-aged female tobacco abuse admitted with worsening respiratory status suspect likely due to COPD exacerbation.  Despite high-dose steroids she is still wheezing and clinically not much improved.  I agree with the current management but will go ahead and decrease steroids down.  I have reviewed her CT chest and I suspect she may have some respiratory bronchiolitis caused from chronic smoking.  I would continue steroids and  bronchodilators for now.  I have advised patient to ambulate and will get physical therapy on board  I would initiate some diuretics to see if there is some improvement in her pulmonary status  Advised patient to quit smoking long-term  I doubt she has active pneumonia with normal white count and negative pro calcitonin on admission.  I would switch her down to oral antibiotics and complete 5 days with oral Zithromax.  We will follow along closely and see how she progresses            Modesto Montague MD  12/16/2019  3:39 PM      Much of this encounter note is an electronic transcription/translation of spoken language to printed text using Dragon Software.

## 2019-12-17 ENCOUNTER — APPOINTMENT (OUTPATIENT)
Dept: GENERAL RADIOLOGY | Facility: HOSPITAL | Age: 64
End: 2019-12-17

## 2019-12-17 LAB
ANION GAP SERPL CALCULATED.3IONS-SCNC: 9.8 MMOL/L (ref 5–15)
BASOPHILS # BLD AUTO: 0.02 10*3/MM3 (ref 0–0.2)
BASOPHILS NFR BLD AUTO: 0.6 % (ref 0–1.5)
BUN BLD-MCNC: 13 MG/DL (ref 8–23)
BUN/CREAT SERPL: 25.5 (ref 7–25)
CALCIUM SPEC-SCNC: 7.4 MG/DL (ref 8.6–10.5)
CHLORIDE SERPL-SCNC: 113 MMOL/L (ref 98–107)
CO2 SERPL-SCNC: 19.2 MMOL/L (ref 22–29)
CREAT BLD-MCNC: 0.51 MG/DL (ref 0.57–1)
DEPRECATED RDW RBC AUTO: 47.6 FL (ref 37–54)
EOSINOPHIL # BLD AUTO: 0 10*3/MM3 (ref 0–0.4)
EOSINOPHIL NFR BLD AUTO: 0 % (ref 0.3–6.2)
ERYTHROCYTE [DISTWIDTH] IN BLOOD BY AUTOMATED COUNT: 15.5 % (ref 12.3–15.4)
GFR SERPL CREATININE-BSD FRML MDRD: 121 ML/MIN/1.73
GLUCOSE BLD-MCNC: 95 MG/DL (ref 65–99)
GLUCOSE BLDC GLUCOMTR-MCNC: 114 MG/DL (ref 70–130)
GLUCOSE BLDC GLUCOMTR-MCNC: 184 MG/DL (ref 70–130)
GLUCOSE BLDC GLUCOMTR-MCNC: 241 MG/DL (ref 70–130)
GLUCOSE BLDC GLUCOMTR-MCNC: 290 MG/DL (ref 70–130)
HCT VFR BLD AUTO: 31.7 % (ref 34–46.6)
HGB BLD-MCNC: 10.1 G/DL (ref 12–15.9)
IMM GRANULOCYTES # BLD AUTO: 0.01 10*3/MM3 (ref 0–0.05)
IMM GRANULOCYTES NFR BLD AUTO: 0.3 % (ref 0–0.5)
LYMPHOCYTES # BLD AUTO: 1.35 10*3/MM3 (ref 0.7–3.1)
LYMPHOCYTES NFR BLD AUTO: 38.2 % (ref 19.6–45.3)
MCH RBC QN AUTO: 26.8 PG (ref 26.6–33)
MCHC RBC AUTO-ENTMCNC: 31.9 G/DL (ref 31.5–35.7)
MCV RBC AUTO: 84.1 FL (ref 79–97)
MONOCYTES # BLD AUTO: 0.33 10*3/MM3 (ref 0.1–0.9)
MONOCYTES NFR BLD AUTO: 9.3 % (ref 5–12)
NEUTROPHILS # BLD AUTO: 1.82 10*3/MM3 (ref 1.7–7)
NEUTROPHILS NFR BLD AUTO: 51.6 % (ref 42.7–76)
NRBC BLD AUTO-RTO: 0 /100 WBC (ref 0–0.2)
PLATELET # BLD AUTO: 139 10*3/MM3 (ref 140–450)
PMV BLD AUTO: 10.2 FL (ref 6–12)
POTASSIUM BLD-SCNC: 3.7 MMOL/L (ref 3.5–5.2)
RBC # BLD AUTO: 3.77 10*6/MM3 (ref 3.77–5.28)
SODIUM BLD-SCNC: 142 MMOL/L (ref 136–145)
WBC NRBC COR # BLD: 3.53 10*3/MM3 (ref 3.4–10.8)

## 2019-12-17 PROCEDURE — 63710000001 PREDNISONE PER 1 MG: Performed by: INTERNAL MEDICINE

## 2019-12-17 PROCEDURE — 92610 EVALUATE SWALLOWING FUNCTION: CPT

## 2019-12-17 PROCEDURE — 25010000002 METHYLPREDNISOLONE PER 40 MG: Performed by: INTERNAL MEDICINE

## 2019-12-17 PROCEDURE — 97110 THERAPEUTIC EXERCISES: CPT

## 2019-12-17 PROCEDURE — 80048 BASIC METABOLIC PNL TOTAL CA: CPT | Performed by: INTERNAL MEDICINE

## 2019-12-17 PROCEDURE — 36415 COLL VENOUS BLD VENIPUNCTURE: CPT | Performed by: INTERNAL MEDICINE

## 2019-12-17 PROCEDURE — 25010000002 ONDANSETRON PER 1 MG: Performed by: INTERNAL MEDICINE

## 2019-12-17 PROCEDURE — 71045 X-RAY EXAM CHEST 1 VIEW: CPT

## 2019-12-17 PROCEDURE — 63710000001 INSULIN GLARGINE PER 5 UNITS: Performed by: INTERNAL MEDICINE

## 2019-12-17 PROCEDURE — 94799 UNLISTED PULMONARY SVC/PX: CPT

## 2019-12-17 PROCEDURE — 25010000002 ENOXAPARIN PER 10 MG: Performed by: INTERNAL MEDICINE

## 2019-12-17 PROCEDURE — 85025 COMPLETE CBC W/AUTO DIFF WBC: CPT | Performed by: INTERNAL MEDICINE

## 2019-12-17 PROCEDURE — 82962 GLUCOSE BLOOD TEST: CPT

## 2019-12-17 PROCEDURE — 63710000001 INSULIN LISPRO (HUMAN) PER 5 UNITS: Performed by: INTERNAL MEDICINE

## 2019-12-17 RX ORDER — PREDNISONE 20 MG/1
20 TABLET ORAL 2 TIMES DAILY WITH MEALS
Status: DISCONTINUED | OUTPATIENT
Start: 2019-12-17 | End: 2019-12-21 | Stop reason: HOSPADM

## 2019-12-17 RX ORDER — METOCLOPRAMIDE 10 MG/1
10 TABLET ORAL
Status: DISCONTINUED | OUTPATIENT
Start: 2019-12-17 | End: 2019-12-21 | Stop reason: HOSPADM

## 2019-12-17 RX ADMIN — DOCUSATE SODIUM 100 MG: 100 CAPSULE, LIQUID FILLED ORAL at 09:10

## 2019-12-17 RX ADMIN — ALLOPURINOL 100 MG: 100 TABLET ORAL at 09:11

## 2019-12-17 RX ADMIN — METOCLOPRAMIDE 10 MG: 10 TABLET ORAL at 17:05

## 2019-12-17 RX ADMIN — ARFORMOTEROL TARTRATE 15 MCG: 15 SOLUTION RESPIRATORY (INHALATION) at 09:15

## 2019-12-17 RX ADMIN — INSULIN GLARGINE 30 UNITS: 100 INJECTION, SOLUTION SUBCUTANEOUS at 09:08

## 2019-12-17 RX ADMIN — AZITHROMYCIN DIHYDRATE 250 MG: 250 TABLET, FILM COATED ORAL at 09:11

## 2019-12-17 RX ADMIN — DOCUSATE SODIUM 100 MG: 100 CAPSULE, LIQUID FILLED ORAL at 21:38

## 2019-12-17 RX ADMIN — ASPIRIN 81 MG: 81 TABLET, CHEWABLE ORAL at 09:11

## 2019-12-17 RX ADMIN — ENOXAPARIN SODIUM 40 MG: 40 INJECTION SUBCUTANEOUS at 21:37

## 2019-12-17 RX ADMIN — INSULIN LISPRO 8 UNITS: 100 INJECTION, SOLUTION INTRAVENOUS; SUBCUTANEOUS at 21:38

## 2019-12-17 RX ADMIN — METOCLOPRAMIDE 10 MG: 10 TABLET ORAL at 12:43

## 2019-12-17 RX ADMIN — SODIUM CHLORIDE, PRESERVATIVE FREE 10 ML: 5 INJECTION INTRAVENOUS at 21:39

## 2019-12-17 RX ADMIN — INSULIN LISPRO 4 UNITS: 100 INJECTION, SOLUTION INTRAVENOUS; SUBCUTANEOUS at 12:43

## 2019-12-17 RX ADMIN — ARFORMOTEROL TARTRATE 15 MCG: 15 SOLUTION RESPIRATORY (INHALATION) at 21:22

## 2019-12-17 RX ADMIN — HYDROCORTISONE 10 MG: 10 TABLET ORAL at 09:11

## 2019-12-17 RX ADMIN — ATORVASTATIN CALCIUM 40 MG: 20 TABLET, FILM COATED ORAL at 09:11

## 2019-12-17 RX ADMIN — IPRATROPIUM BROMIDE AND ALBUTEROL SULFATE 3 ML: 2.5; .5 SOLUTION RESPIRATORY (INHALATION) at 21:22

## 2019-12-17 RX ADMIN — ISOSORBIDE MONONITRATE 30 MG: 30 TABLET ORAL at 09:11

## 2019-12-17 RX ADMIN — IPRATROPIUM BROMIDE AND ALBUTEROL SULFATE 3 ML: 2.5; .5 SOLUTION RESPIRATORY (INHALATION) at 15:54

## 2019-12-17 RX ADMIN — METOCLOPRAMIDE 5 MG: 5 TABLET ORAL at 06:43

## 2019-12-17 RX ADMIN — CLOPIDOGREL 75 MG: 75 TABLET, FILM COATED ORAL at 09:11

## 2019-12-17 RX ADMIN — CLONAZEPAM 0.25 MG: 0.5 TABLET ORAL at 09:10

## 2019-12-17 RX ADMIN — PREDNISONE 20 MG: 20 TABLET ORAL at 17:05

## 2019-12-17 RX ADMIN — INSULIN GLARGINE 20 UNITS: 100 INJECTION, SOLUTION SUBCUTANEOUS at 21:38

## 2019-12-17 RX ADMIN — GUAIFENESIN AND DEXTROMETHORPHAN HYDROBROMIDE 1 TABLET: 600; 30 TABLET, EXTENDED RELEASE ORAL at 21:38

## 2019-12-17 RX ADMIN — ONDANSETRON 4 MG: 2 INJECTION INTRAMUSCULAR; INTRAVENOUS at 12:43

## 2019-12-17 RX ADMIN — TRAMADOL HYDROCHLORIDE 50 MG: 50 TABLET, FILM COATED ORAL at 09:10

## 2019-12-17 RX ADMIN — TRAMADOL HYDROCHLORIDE 50 MG: 50 TABLET, FILM COATED ORAL at 17:05

## 2019-12-17 RX ADMIN — CLONAZEPAM 0.25 MG: 0.5 TABLET ORAL at 21:38

## 2019-12-17 RX ADMIN — CALCITONIN SALMON 1 SPRAY: 200 SPRAY, METERED NASAL at 09:24

## 2019-12-17 RX ADMIN — GUAIFENESIN AND DEXTROMETHORPHAN HYDROBROMIDE 1 TABLET: 600; 30 TABLET, EXTENDED RELEASE ORAL at 09:11

## 2019-12-17 RX ADMIN — HYDROCORTISONE 10 MG: 10 TABLET ORAL at 21:38

## 2019-12-17 RX ADMIN — LEVOTHYROXINE SODIUM 88 MCG: 88 TABLET ORAL at 06:43

## 2019-12-17 RX ADMIN — METHYLPREDNISOLONE SODIUM SUCCINATE 40 MG: 40 INJECTION, POWDER, LYOPHILIZED, FOR SOLUTION INTRAMUSCULAR; INTRAVENOUS at 06:43

## 2019-12-17 RX ADMIN — AMLODIPINE BESYLATE 10 MG: 10 TABLET ORAL at 09:11

## 2019-12-17 RX ADMIN — IPRATROPIUM BROMIDE AND ALBUTEROL SULFATE 3 ML: 2.5; .5 SOLUTION RESPIRATORY (INHALATION) at 12:26

## 2019-12-17 RX ADMIN — SODIUM CHLORIDE, PRESERVATIVE FREE 10 ML: 5 INJECTION INTRAVENOUS at 09:11

## 2019-12-17 RX ADMIN — INSULIN LISPRO 12 UNITS: 100 INJECTION, SOLUTION INTRAVENOUS; SUBCUTANEOUS at 17:05

## 2019-12-17 RX ADMIN — FUROSEMIDE 40 MG: 40 TABLET ORAL at 09:11

## 2019-12-17 NOTE — PROGRESS NOTES
"[facility] PULMONARY CARE         Dr Salvador Sayied   LOS: 4 days   Patient Care Team:  Esha Medley as PCP - General (Nurse Practitioner)    Chief Complaint: Acute exacerbation of COPD with suspected ILD caused by respiratory bronchiolitis chronic respiratory failure tobacco abuse diastolic CHF    Interval History: Resting comfortably feels a little bit better today.  Still cough congestion shortness of breath and wheezing ongoing    REVIEW OF SYSTEMS:   CARDIOVASCULAR: No chest pain, chest pressure or chest discomfort. No palpitations or edema.   RESPIRATORY: Shortness of breath cough congestion and wheezing  GASTROINTESTINAL: No anorexia, nausea, vomiting or diarrhea. No abdominal pain or blood.   HEMATOLOGIC: No bleeding or bruising.     Ventilator/Non-Invasive Ventilation Settings (From admission, onward)    None            Vital Signs  Temp:  [96.9 °F (36.1 °C)-98 °F (36.7 °C)] 98 °F (36.7 °C)  Heart Rate:  [74-90] 74  Resp:  [12-16] 16  BP: (142-189)/(67-95) 181/86    Intake/Output Summary (Last 24 hours) at 12/17/2019 1101  Last data filed at 12/17/2019 0630  Gross per 24 hour   Intake 440 ml   Output 2100 ml   Net -1660 ml     Flowsheet Rows      First Filed Value   Admission Height  149.9 cm (59\") Documented at 12/13/2019 1208   Admission Weight  73.1 kg (161 lb 1.6 oz) Documented at 12/13/2019 1208          Physical Exam:   General Appearance:    Alert, cooperative, in no acute distress.  No labored breathing   Lungs:    Diminished breath sounds bilateral wheezing    Heart:    Regular rhythm and normal rate, normal S1 and S2, no            murmur, no gallop, no rub, no click   Chest Wall:    No abnormalities observed   Abdomen:     Normal bowel sounds, no masses, no organomegaly, soft        non-tender, non-distended, no guarding, no rebound                tenderness   Extremities:   Moves all extremities well, no edema, no cyanosis, no             redness     Results Review:        Results from " last 7 days   Lab Units 12/17/19  0638 12/16/19  0424 12/15/19  0555   SODIUM mmol/L 142 139 139   POTASSIUM mmol/L 3.7 4.3 4.3   CHLORIDE mmol/L 113* 103 102   CO2 mmol/L 19.2* 26.4 25.0   BUN mg/dL 13 40* 39*   CREATININE mg/dL 0.51* 0.87 1.05*   GLUCOSE mg/dL 95 145* 195*   CALCIUM mg/dL 7.4* 8.9 8.5*     Results from last 7 days   Lab Units 12/13/19  2228 12/13/19  1614 12/13/19  1236   TROPONIN T ng/mL <0.010 <0.010 <0.010     Results from last 7 days   Lab Units 12/17/19  0638 12/16/19  0424 12/15/19  0555   WBC 10*3/mm3 3.53 12.58* 13.04*   HEMOGLOBIN g/dL 10.1* 10.1* 10.3*   HEMATOCRIT % 31.7* 30.7* 31.7*   PLATELETS 10*3/mm3 139* 141 163     Results from last 7 days   Lab Units 12/13/19  1303   INR  0.91   APTT seconds 21.4*                       I reviewed the patient's new clinical results.  I personally viewed and interpreted the patient's CXR        Medication Review:     allopurinol 100 mg Oral Daily   amLODIPine 10 mg Oral Daily   arformoterol 15 mcg Nebulization BID - RT   aspirin 81 mg Oral Daily   atorvastatin 40 mg Oral Daily   azithromycin 250 mg Oral Q24H   budesonide 1 mg Nebulization BID - RT   calcitonin (salmon) 1 spray Alternating Nares Daily   clonazePAM 0.25 mg Oral BID   clopidogrel 75 mg Oral Daily   docusate sodium 100 mg Oral BID   enoxaparin 40 mg Subcutaneous Q24H   furosemide 40 mg Oral Daily   guaifenesin-dextromethorphan 1 tablet Oral BID   hydrocortisone 10 mg Oral BID   insulin glargine 20 Units Subcutaneous Nightly   insulin glargine 30 Units Subcutaneous Daily With Breakfast   insulin lispro 0-24 Units Subcutaneous 4x Daily With Meals & Nightly   ipratropium-albuterol 3 mL Nebulization 4x Daily - RT   isosorbide mononitrate 30 mg Oral Daily   levothyroxine 88 mcg Oral Q AM   methylPREDNISolone sodium succinate 40 mg Intravenous Q8H   metoclopramide 10 mg Oral TID AC   sodium chloride 10 mL Intravenous Q12H            ASSESSMENT:   Acute exacerbation of COPD  Questionable  pneumonia/interstitial changes/respiratory bronchiolitis  Chronic respiratory failure  Tobacco abuse  Diastolic CHF  Obesity    PLAN:  Still wheezing on my today's exam.  Clinically she seems to be doing better today.  Continue weaning down steroids as tolerated  Ambulate  Wean down oxygen as tolerated  Complete 5 days of Zithromax p.o.  Advised patient to quit smoking long-term  From the CT it appears more interstitial changes suspected to be respiratory bronchiolitis from chronic smoking.  I would recommend follow-up CT chest in 3 months to see progression  Discussed plan of care in detail with the patient  We will follow along    Modesto Montague MD  12/17/19  11:01 AM

## 2019-12-17 NOTE — PROGRESS NOTES
Name: Vanessa Colunga ADMIT: 2019   : 1955  PCP: Esha Medley    MRN: [id number] LOS: 4 days   AGE/SEX: 64 y.o. female  ROOM: [facility]/   Subjective   Dyspnea about the same. Nonproductive cough. Nausea with regurgitation with food this morning. No choking reported. She takes reglan for gp and reports this happens occasionally.    Objective   Vital Signs  Temp:  [96.9 °F (36.1 °C)-98 °F (36.7 °C)] 98 °F (36.7 °C)  Heart Rate:  [74-90] 74  Resp:  [12-18] 16  BP: (142-189)/(67-95) 181/86  SpO2:  [96 %-100 %] 99 %  on  Flow (L/min):  [2] 2;   Device (Oxygen Therapy): nasal cannula  Body mass index is 33.02 kg/m².    Physical Exam   Constitutional: She appears well-developed. No distress.   HENT:   Head: Atraumatic.   Nose: Nose normal.   Eyes: Conjunctivae and EOM are normal.   Neck: Neck supple. No tracheal deviation present.   Cardiovascular: Normal rate, regular rhythm and intact distal pulses.   Pulmonary/Chest: Effort normal. She has decreased breath sounds. She has wheezes. She has no rales.   Abdominal: Soft. She exhibits no distension. There is no tenderness. There is no rebound and no guarding.   Musculoskeletal: She exhibits no edema or tenderness.   Neurological: She is alert. No cranial nerve deficit.   Skin: Skin is warm and dry. She is not diaphoretic.   Psychiatric: She has a normal mood and affect. Her behavior is normal.   Nursing note and vitals reviewed.      Results Review:       I reviewed the patient's new clinical results.      Results from last 7 days   Lab Units 19  0638 19  0424 12/15/19  0555 19  0712   WBC 10*3/mm3 3.53 12.58* 13.04* 8.09   HEMOGLOBIN g/dL 10.1* 10.1* 10.3* 11.9*   PLATELETS 10*3/mm3 139* 141 163 171     Results from last 7 days   Lab Units 19  0638 19  0424 12/15/19  0555 19  0712   SODIUM mmol/L 142 139 139 132*   POTASSIUM mmol/L 3.7 4.3 4.3 4.7   CHLORIDE mmol/L 113* 103 102 98   CO2 mmol/L 19.2* 26.4 25.0 23.2   BUN  mg/dL 13 40* 39* 39*   CREATININE mg/dL 0.51* 0.87 1.05* 0.75   GLUCOSE mg/dL 95 145* 195* 290*   Estimated Creatinine Clearance: 64.4 mL/min (A) (by C-G formula based on SCr of 0.51 mg/dL (L)).  Results from last 7 days   Lab Units 12/17/19  0638 12/16/19  0424 12/15/19  0555 12/14/19  0712 12/13/19  1236   CALCIUM mg/dL 7.4* 8.9 8.5* 8.7 9.2   ALBUMIN g/dL  --   --   --   --  3.40*         allopurinol 100 mg Oral Daily   amLODIPine 10 mg Oral Daily   arformoterol 15 mcg Nebulization BID - RT   aspirin 81 mg Oral Daily   atorvastatin 40 mg Oral Daily   azithromycin 250 mg Oral Q24H   budesonide 1 mg Nebulization BID - RT   calcitonin (salmon) 1 spray Alternating Nares Daily   clonazePAM 0.25 mg Oral BID   clopidogrel 75 mg Oral Daily   docusate sodium 100 mg Oral BID   enoxaparin 40 mg Subcutaneous Q24H   furosemide 40 mg Oral Daily   guaifenesin-dextromethorphan 1 tablet Oral BID   hydrocortisone 10 mg Oral BID   insulin glargine 20 Units Subcutaneous Nightly   insulin glargine 30 Units Subcutaneous Daily With Breakfast   insulin lispro 0-24 Units Subcutaneous 4x Daily With Meals & Nightly   ipratropium-albuterol 3 mL Nebulization 4x Daily - RT   isosorbide mononitrate 30 mg Oral Daily   levothyroxine 88 mcg Oral Q AM   methylPREDNISolone sodium succinate 40 mg Intravenous Q8H   metoclopramide 5 mg Oral TID AC   sodium chloride 10 mL Intravenous Q12H      Diet Regular; Cardiac, Consistent Carbohydrate      Assessment/Plan      Active Hospital Problems    Diagnosis  POA   • Pneumonia of both upper lobes due to infectious organism (CMS/[facility]) [J18.1]  Yes   • Chronic diastolic CHF (congestive heart failure) (CMS/[facility]) [I50.32]  Yes   • Type 2 diabetes mellitus with circulatory disorder, with long-term current use of insulin (CMS/[facility]) [E11.59, Z79.4]  Not Applicable   • Essential hypertension [I10]  Yes   • Chronic respiratory failure with hypoxia and hypercapnia (CMS/[facility]) [J96.11, J96.12]  Yes   • DNR (do not  resuscitate) [Z66]  Yes   • History of ESBL E. coli infection [Z86.19]  Yes   • [place]'s disease (CMS/[facility]) [E27.1]  Yes   • Chronic kidney disease, stage III (moderate) (CMS/[facility]) [N18.3]  Yes   • Hypothyroidism (acquired) [E03.9]  Yes   • Gastroparesis diabeticorum (CMS/[facility]) [E11.43, K31.84]  Yes   • Paroxysmal atrial fibrillation (CMS/[facility]) [I48.0]  Yes   • Coronary artery disease involving native coronary artery without angina pectoris [I25.10]  Yes   • COPD with acute exacerbation (CMS/[facility]) [J44.1]  Yes      Resolved Hospital Problems   No resolved problems to display.       · COPD exacerbation/pneumonia/acute chronic hypoxic respiratory failure: Continue steroid andbbreathing tx. Weaned abx noted. Possible aspiration with gastroparesis hx and intermittent regurgitation reported. Will repeat CXR to monitor and ask SLP to see. Pulmonology following.  · Diabetes: Continue insulin  · CKD 3: Stable  · Gastroparesis: With persisting sx, will try increased reglan dose. SLP as above.  · [place]'s: On Cortef  · PAF: Sinus on my exam  · Chronic diastolic heart failure  · Disposition: TBD    Imer Plascencia MD  [facility]  12/17/19  9:15 AM    Dictated portions using Dragon dictation software.

## 2019-12-17 NOTE — THERAPY TREATMENT NOTE
Acute Care - Physical Therapy Treatment Note  [facility]     Patient Name: Vanessa Colunga  : 1955  MRN: [id number]  Today's Date: 2019  Onset of Illness/Injury or Date of Surgery: 19  Date of Referral to PT: 19  Referring Physician: DONY)    Admit Date: 2019    Visit Dx:    ICD-10-CM ICD-9-CM   1. Pneumonia of both upper lobes due to infectious organism (CMS/[facility]) J18.1 483.8   2. COPD exacerbation (CMS/[facility]) J44.1 491.21   3. Chest pain, unspecified type R07.9 786.50   4. Decreased mobility and endurance Z74.09 780.99     Patient Active Problem List   Diagnosis   • Pneumonia of both upper lobes due to infectious organism (CMS/[facility])   • Chronic diastolic CHF (congestive heart failure) (CMS/[facility])   • Type 2 diabetes mellitus with circulatory disorder, with long-term current use of insulin (CMS/[facility])   • Essential hypertension   • Chronic respiratory failure with hypoxia and hypercapnia (CMS/[facility])   • DNR (do not resuscitate)   • History of ESBL E. coli infection   • [place]'s disease (CMS/[facility])   • Chronic kidney disease, stage III (moderate) (CMS/[facility])   • Hypothyroidism (acquired)   • Gastroparesis diabeticorum (CMS/[facility])   • Paroxysmal atrial fibrillation (CMS/[facility])   • Coronary artery disease involving native coronary artery without angina pectoris   • COPD with acute exacerbation (CMS/[facility])       Therapy Treatment    Rehabilitation Treatment Summary     Row Name 19 1387             Treatment Time/Intention    Discipline  physical therapy assistant  -LAURENT      Document Type  therapy note (daily note)  -LAURENT      Subjective Information  complains of;fatigue;pain  -LAURENT      Mode of Treatment  individual therapy;physical therapy  -      Care Plan Review  patient/other agree to care plan  -      Existing Precautions/Restrictions  fall;oxygen therapy device and L/min 2L  -LAURENT      Recorded by [LAURENT] Cris Shelley PTA 19 0834      Row Name 19 0830             Vital Signs    Pre  SpO2 (%)  99  -      O2 Delivery Pre Treatment  supplemental O2  -      Post SpO2 (%)  99  -      O2 Delivery Post Treatment  supplemental O2  -      Recorded by [] Cris Shelley, [facility] 12/17/19 0850      Row Name 12/17/19 0849             Bed Mobility Assessment/Treatment    Supine-Sit [place] (Bed Mobility)  2 person assist;minimum assist (75% patient effort)  -      Assistive Device (Bed Mobility)  draw sheet  -JM      Recorded by [] Cris Shelley, [facility] 12/17/19 1812      Row Name 12/17/19 0849             Sit-Stand Transfer    Sit-Stand [place] (Transfers)  2 person assist;minimum assist (75% patient effort);contact guard;verbal cues  -      Assistive Device (Sit-Stand Transfers)  walker, front-wheeled  -      Recorded by [] Cris Shelley, [facility] 12/17/19 1812      Row Name 12/17/19 0849             Stand-Sit Transfer    Stand-Sit [place] (Transfers)  contact guard;verbal cues;nonverbal cues (demo/gesture)  -      Assistive Device (Stand-Sit Transfers)  walker, front-wheeled  -      Recorded by [] Cris Shelley, [facility] 12/17/19 1812      Row Name 12/17/19 0849             Gait/Stairs Assessment/Training    [place] Level (Gait)  2 person assist;minimum assist (75% patient effort);contact guard;verbal cues  -      Assistive Device (Gait)  walker, front-wheeled  -      Distance in Feet (Gait)  30  -JM      Deviations/Abnormal Patterns (Gait)  stride length decreased  -      Bilateral Gait Deviations  forward flexed posture;weight shift ability decreased  -      Comment (Gait/Stairs)  fatigued quickly, but did not require seated rest  -      Recorded by [] Cris Shelley, [facility] 12/17/19 1812      Row Name 12/17/19 0849             Positioning and Restraints    Pre-Treatment Position  in bed  -      Post Treatment Position  chair  -JM      In Chair  reclined;call light within reach;encouraged to call for assist;notified nsg;heels elevated no alarm upon  entry, did put strip in ch, nsg aware no box  -JM      Recorded by [] Cris Shelley PTA 12/17/19 1812      Row Name 12/17/19 0849             Pain Scale: Numbers Pre/Post-Treatment    Pain Scale: Numbers, Pretreatment  0/10 - no pain  -      Pain Scale: Numbers, Post-Treatment  4/10  -      Pain Location - Orientation  generalized  -      Pain Intervention(s)  Repositioned  -      Recorded by [LAURENT] Cris Shelley PTA 12/17/19 1812        User Key  (r) = Recorded By, (t) = Taken By, (c) = Cosigned By    Initials Name Effective Dates Discipline    Cris Jhaveri PTA 03/07/18 -  PT                       PT Recommendation and Plan     Plan of Care Reviewed With: patient  Outcome Summary: agreed to PT but was not sure how much she could walk; also agreed to sit in chair after encouragement, still assist of 2 for safety; plans SNU; alarm strip placed in chair but no alarm box in room , nsg aware and state she calls out for assist and they will try to find alarm box  Outcome Measures     Row Name 12/17/19 1800             How much help from another person do you currently need...    Turning from your back to your side while in flat bed without using bedrails?  3  -JM      Moving from lying on back to sitting on the side of a flat bed without bedrails?  2  -JM      Moving to and from a bed to a chair (including a wheelchair)?  3  -JM      Standing up from a chair using your arms (e.g., wheelchair, bedside chair)?  3  -JM      Climbing 3-5 steps with a railing?  1  -JM      To walk in hospital room?  3  -JM      AM-PAC 6 Clicks Score (PT)  15  -JM        User Key  (r) = Recorded By, (t) = Taken By, (c) = Cosigned By    Initials Name Provider Type    Cris Jhaveri PTA Physical Therapy Assistant         Time Calculation:   PT Charges     Row Name 12/17/19 0849             Time Calculation    Start Time  0830  -      Stop Time  0849  -      Time Calculation (min)  19 min  -LAURENT      PT Received On   12/17/19  -LAURENT      PT - Next Appointment  12/18/19  -LAUERNT        User Key  (r) = Recorded By, (t) = Taken By, (c) = Cosigned By    Initials Name Provider Type    Cris Jhaveri PTA Physical Therapy Assistant        Therapy Charges for Today     Code Description Service Date Service Provider Modifiers Qty    42092870400 HC PT THER PROC EA 15 MIN 12/17/2019 Cris Shelley PTA GP 1    02143501011 HC PT THER SUPP EA 15 MIN 12/17/2019 Cris Shelley PTA GP 1          PT G-Codes  Outcome Measure Options: AM-PAC 6 Clicks Basic Mobility (PT)  AM-PAC 6 Clicks Score (PT): 15    Cris Shelley PTA  12/17/2019

## 2019-12-17 NOTE — PLAN OF CARE
Problem: Patient Care Overview  Goal: Plan of Care Review  Outcome: Ongoing (interventions implemented as appropriate)  Flowsheets (Taken 12/17/2019 1044)  Plan of Care Reviewed With: patient  Note:   Bedside swallow evaluation completed. Oropharyngeal swallow appears WFL. Patient with gastroparesis and reports nausea and regurgitation with almost every meal. Concern for aspiration secondary to regurgitation. Rec: continued management of gastroparesis. Regular diet with thin liquids. Meds as tolerated. Upright for PO intake, small bites/sips.

## 2019-12-17 NOTE — PLAN OF CARE
Problem: Patient Care Overview  Goal: Plan of Care Review  Outcome: Ongoing (interventions implemented as appropriate)  Flowsheets (Taken 12/17/2019 1812)  Plan of Care Reviewed With: patient  Outcome Summary: agreed to PT but was not sure how much she could walk-she did incr amb dist slightly w/o seated rest today; also agreed to sit in chair after encouragement, still assist of 2 for safety; plans SNU; alarm strip placed in chair but no alarm box in room , nsg aware and state she calls out for assist and they will try to find alarm box

## 2019-12-17 NOTE — PLAN OF CARE
Problem: Patient Care Overview  Goal: Plan of Care Review  Outcome: Ongoing (interventions implemented as appropriate)  Flowsheets (Taken 2019 6279)  Progress: improving  Plan of Care Reviewed With: patient  Outcome Summary: Pt balanced rest with activity throughout day. C/O pain, PO pain med admin with some relief. C/O SOA and nausea, relief reported from both through breathing tx and nausea med. BM today. contact isolation removed per MD. CXR obtained. Now on PO steroids. A&O. Monitored closely  Goal: Individualization and Mutuality  Outcome: Ongoing (interventions implemented as appropriate)  Goal: Discharge Needs Assessment  Outcome: Ongoing (interventions implemented as appropriate)  Goal: Interprofessional Rounds/Family Conf  Outcome: Ongoing (interventions implemented as appropriate)     Problem: Pain, Chronic (Adult)  Goal: Acceptable Pain/Comfort Level and Functional Ability  Outcome: Ongoing (interventions implemented as appropriate)     Problem: Fall Risk (Adult)  Goal: Absence of Fall  Outcome: Ongoing (interventions implemented as appropriate)     Problem: Skin Injury Risk (Adult)  Goal: Skin Health and Integrity  Outcome: Ongoing (interventions implemented as appropriate)     Problem: Chronic Obstructive Pulmonary Disease (Adult)  Description  Prevent and manage potential problems includin. functional deficit  2. infection  3. respiratory compromise  4. situational response  5. undernutrition  Goal: Signs and Symptoms of Listed Potential Problems Will be Absent, Minimized or Managed (Chronic Obstructive Pulmonary Disease)  Outcome: Ongoing (interventions implemented as appropriate)

## 2019-12-17 NOTE — PLAN OF CARE
Problem: Patient Care Overview  Goal: Plan of Care Review  Outcome: Ongoing (interventions implemented as appropriate)  Flowsheets (Taken 12/17/2019 0333)  Progress: improving  Plan of Care Reviewed With: patient  Outcome Summary: Purewick to wall suction.  Turn Q2.  O2 2L NC.  No cough noted.

## 2019-12-17 NOTE — THERAPY DISCHARGE NOTE
Acute Care - Speech Language Pathology   Swallow Eval/Discharge [facility]     Patient Name: Vanessa Colunga  : 1955  MRN: [id number]  Today's Date: 2019  Onset of Illness/Injury or Date of Surgery: 19     Referring Physician: DONY)      Admit Date: 2019    Visit Dx:    ICD-10-CM ICD-9-CM   1. Pneumonia of both upper lobes due to infectious organism (CMS/[facility]) J18.1 483.8   2. COPD exacerbation (CMS/[facility]) J44.1 491.21   3. Chest pain, unspecified type R07.9 786.50   4. Decreased mobility and endurance Z74.09 780.99     Patient Active Problem List   Diagnosis   • Pneumonia of both upper lobes due to infectious organism (CMS/[facility])   • Chronic diastolic CHF (congestive heart failure) (CMS/[facility])   • Type 2 diabetes mellitus with circulatory disorder, with long-term current use of insulin (CMS/[facility])   • Essential hypertension   • Chronic respiratory failure with hypoxia and hypercapnia (CMS/[facility])   • DNR (do not resuscitate)   • History of ESBL E. coli infection   • [place]'s disease (CMS/[facility])   • Chronic kidney disease, stage III (moderate) (CMS/[facility])   • Hypothyroidism (acquired)   • Gastroparesis diabeticorum (CMS/[facility])   • Paroxysmal atrial fibrillation (CMS/[facility])   • Coronary artery disease involving native coronary artery without angina pectoris   • COPD with acute exacerbation (CMS/[facility])     Past Medical History:   Diagnosis Date   • CHF (congestive heart failure) (CMS/[facility])    • COPD (chronic obstructive pulmonary disease) (CMS/[facility])    • Diabetes mellitus (CMS/[facility])    • Hypertension      Past Surgical History:   Procedure Laterality Date   • CHOLECYSTECTOMY     • CORONARY ANGIOPLASTY WITH STENT PLACEMENT      3 stents in  (approximate)          SWALLOW EVALUATION (last 72 hours)      SLP Adult Swallow Evaluation     Row Name 19 1000                   Rehab Evaluation    Document Type  evaluation  -HS        Subjective Information  no complaints  -HS        Patient Observations   alert;cooperative  -HS        Patient/Family Observations  No family present during evaluation.  -HS        Patient Effort  good  -HS        Symptoms Noted During/After Treatment  none  -HS           General Information    Patient Profile Reviewed  yes  -HS        Pertinent History Of Current Problem  Admitted with dyspnea, cough. Dx: COPD (2L O2 at baseline), gastroparesis, smoker, DM type II. Chest CT: interstitial changes in upper lobes, slightly more extensive on right than left. Had nausea with regurgitation secondary to gastroparesis this AM. Patient states this occurs at almost every meal.   -HS        Current Method of Nutrition  regular textures;thin liquids  -HS        Prior Level of Function-Swallowing  no diet consistency restrictions;safe, efficient swallowing in all situations  -HS        Plans/Goals Discussed with  patient  -HS        Barriers to Rehab  none identified  -HS        Patient's Goals for Discharge  patient did not state  -HS           Oral Motor and Function    Dentition Assessment  edentulous, does not have dentures  -HS        Secretion Management  WNL/WFL  -HS        Mucosal Quality  moist, healthy  -HS        Volitional Swallow  WFL  -HS           Oral Musculature and Cranial Nerve Assessment    Oral Motor General Assessment  WFL  -HS           General Eating/Swallowing Observations    Respiratory Support Currently in Use  nasal cannula 2L O2  -HS        Eating/Swallowing Skills  self-fed  -HS        Positioning During Eating  upright in bed  -HS        Utensils Used  spoon;cup;straw  -HS        Consistencies Trialed  regular textures;soft textures;pureed;thin liquids  -HS        Pre SpO2 (%)  100  -HS        Post SpO2 (%)  100  -HS           Clinical Swallow Eval    Clinical Swallow Evaluation Summary  Oropharyngeal swallow appears WFL. No overt s/s of aspiration with thin liquids via cup or straw, puree, mechanical soft, or regular. Increased mastication time with regular trial due  to patient being edentulous, however adequate bolus prep and transit.   -HS           Clinical Impression    SLP Swallowing Diagnosis  functional oral phase;functional pharyngeal phase;esophageal dysfunction  -HS        Functional Impact  no impact on function  -HS        Swallow Criteria for Skilled Therapeutic Interventions Met  no problems identified which require skilled intervention  -HS           Recommendations    Therapy Frequency (Swallow)  evaluation only  -HS        SLP Diet Recommendation  regular textures;thin liquids  -HS        Recommended Precautions and Strategies  upright posture during/after eating;small bites of food and sips of liquid  -HS        SLP Rec. for Method of Medication Administration  meds whole;with thin liquids;with pudding or applesauce;as tolerated  -HS        Anticipated Dischage Disposition  -- No further ST needs at this time.  -HS        Demonstrates Need for Referral to Another Service  gastroenterology;other (see comments) management of gastroparesis  -HS          User Key  (r) = Recorded By, (t) = Taken By, (c) = Cosigned By    Initials Name Effective Dates     Ria Kenney, MS CCC-SLP 06/08/18 -           EDUCATION  The patient has been educated in the following areas:   Dysphagia (Swallowing Impairment).    SLP Recommendation and Plan  SLP Swallowing Diagnosis: functional oral phase, functional pharyngeal phase, esophageal dysfunction  SLP Diet Recommendation: regular textures, thin liquids           Swallow Criteria for Skilled Therapeutic Interventions Met: no problems identified which require skilled intervention  Anticipated Dischage Disposition: (No further ST needs at this time.)     Therapy Frequency (Swallow): evaluation only     Demonstrates Need for Referral to Another Service: gastroenterology, other (see comments)(management of gastroparesis)    Plan of Care Reviewed With: patient           SLP Outcome Measures (last 72 hours)      SLP Outcome Measures      Row Name 12/17/19 1000             SLP Outcome Measures    Outcome Measure Used?  Adult NOMS  -HS         Adult FCM Scores    FCM Chosen  Swallowing  -HS      Swallowing FCM Score  6  -HS        User Key  (r) = Recorded By, (t) = Taken By, (c) = Cosigned By    Initials Name Effective Dates    Ria Jorge MS CCC-SLP 06/08/18 -            Time Calculation:   Time Calculation- SLP     Row Name 12/17/19 1047             Time Calculation- SLP    SLP Start Time  1000  -HS      SLP Stop Time  1045  -      SLP Time Calculation (min)  45 min  -HS      SLP Received On  12/17/19  -HS        User Key  (r) = Recorded By, (t) = Taken By, (c) = Cosigned By    Initials Name Provider Type    Ria Jorge MS CCC-SLP Speech and Language Pathologist          Therapy Charges for Today     Code Description Service Date Service Provider Modifiers Qty    11598175482 HC ST EVAL ORAL PHARYNG SWALLOW 3 12/17/2019 Ria Kenney MS CCC-SLP GN 1               SLP Discharge Summary  Anticipated Dischage Disposition: (No further ST needs at this time.)    Ria Kenney MS CCC-SLP  12/17/2019

## 2019-12-18 LAB
ANION GAP SERPL CALCULATED.3IONS-SCNC: 9.6 MMOL/L (ref 5–15)
BACTERIA SPEC AEROBE CULT: NORMAL
BACTERIA SPEC AEROBE CULT: NORMAL
BUN BLD-MCNC: 35 MG/DL (ref 8–23)
BUN/CREAT SERPL: 49.3 (ref 7–25)
CALCIUM SPEC-SCNC: 8.8 MG/DL (ref 8.6–10.5)
CHLORIDE SERPL-SCNC: 100 MMOL/L (ref 98–107)
CO2 SERPL-SCNC: 29.4 MMOL/L (ref 22–29)
CREAT BLD-MCNC: 0.71 MG/DL (ref 0.57–1)
DEPRECATED RDW RBC AUTO: 50.4 FL (ref 37–54)
ERYTHROCYTE [DISTWIDTH] IN BLOOD BY AUTOMATED COUNT: 15.2 % (ref 12.3–15.4)
GFR SERPL CREATININE-BSD FRML MDRD: 82 ML/MIN/1.73
GLUCOSE BLD-MCNC: 86 MG/DL (ref 65–99)
GLUCOSE BLDC GLUCOMTR-MCNC: 105 MG/DL (ref 70–130)
GLUCOSE BLDC GLUCOMTR-MCNC: 188 MG/DL (ref 70–130)
GLUCOSE BLDC GLUCOMTR-MCNC: 226 MG/DL (ref 70–130)
GLUCOSE BLDC GLUCOMTR-MCNC: 60 MG/DL (ref 70–130)
GLUCOSE BLDC GLUCOMTR-MCNC: 90 MG/DL (ref 70–130)
HCT VFR BLD AUTO: 32 % (ref 34–46.6)
HGB BLD-MCNC: 10.8 G/DL (ref 12–15.9)
MCH RBC QN AUTO: 30.3 PG (ref 26.6–33)
MCHC RBC AUTO-ENTMCNC: 33.8 G/DL (ref 31.5–35.7)
MCV RBC AUTO: 89.9 FL (ref 79–97)
PLATELET # BLD AUTO: 136 10*3/MM3 (ref 140–450)
PMV BLD AUTO: 10.7 FL (ref 6–12)
POTASSIUM BLD-SCNC: 4.1 MMOL/L (ref 3.5–5.2)
RBC # BLD AUTO: 3.56 10*6/MM3 (ref 3.77–5.28)
SODIUM BLD-SCNC: 139 MMOL/L (ref 136–145)
WBC NRBC COR # BLD: 9.34 10*3/MM3 (ref 3.4–10.8)

## 2019-12-18 PROCEDURE — 82962 GLUCOSE BLOOD TEST: CPT

## 2019-12-18 PROCEDURE — 63710000001 INSULIN GLARGINE PER 5 UNITS: Performed by: INTERNAL MEDICINE

## 2019-12-18 PROCEDURE — 97110 THERAPEUTIC EXERCISES: CPT

## 2019-12-18 PROCEDURE — 85027 COMPLETE CBC AUTOMATED: CPT | Performed by: INTERNAL MEDICINE

## 2019-12-18 PROCEDURE — 25010000002 ONDANSETRON PER 1 MG: Performed by: INTERNAL MEDICINE

## 2019-12-18 PROCEDURE — 25010000002 ENOXAPARIN PER 10 MG: Performed by: INTERNAL MEDICINE

## 2019-12-18 PROCEDURE — 94799 UNLISTED PULMONARY SVC/PX: CPT

## 2019-12-18 PROCEDURE — 80048 BASIC METABOLIC PNL TOTAL CA: CPT | Performed by: INTERNAL MEDICINE

## 2019-12-18 PROCEDURE — 63710000001 PREDNISONE PER 1 MG: Performed by: INTERNAL MEDICINE

## 2019-12-18 PROCEDURE — 63710000001 INSULIN LISPRO (HUMAN) PER 5 UNITS: Performed by: INTERNAL MEDICINE

## 2019-12-18 RX ORDER — AZITHROMYCIN 250 MG/1
500 TABLET, FILM COATED ORAL EVERY 24 HOURS
Status: COMPLETED | OUTPATIENT
Start: 2019-12-18 | End: 2019-12-21

## 2019-12-18 RX ADMIN — GUAIFENESIN AND DEXTROMETHORPHAN HYDROBROMIDE 1 TABLET: 600; 30 TABLET, EXTENDED RELEASE ORAL at 08:21

## 2019-12-18 RX ADMIN — ALLOPURINOL 100 MG: 100 TABLET ORAL at 08:21

## 2019-12-18 RX ADMIN — SODIUM CHLORIDE, PRESERVATIVE FREE 10 ML: 5 INJECTION INTRAVENOUS at 09:31

## 2019-12-18 RX ADMIN — ATORVASTATIN CALCIUM 40 MG: 20 TABLET, FILM COATED ORAL at 08:20

## 2019-12-18 RX ADMIN — METOCLOPRAMIDE 10 MG: 10 TABLET ORAL at 17:02

## 2019-12-18 RX ADMIN — AMLODIPINE BESYLATE 10 MG: 10 TABLET ORAL at 08:21

## 2019-12-18 RX ADMIN — INSULIN LISPRO 8 UNITS: 100 INJECTION, SOLUTION INTRAVENOUS; SUBCUTANEOUS at 21:52

## 2019-12-18 RX ADMIN — DOCUSATE SODIUM 100 MG: 100 CAPSULE, LIQUID FILLED ORAL at 20:04

## 2019-12-18 RX ADMIN — HYDROCORTISONE 10 MG: 10 TABLET ORAL at 08:20

## 2019-12-18 RX ADMIN — TRAMADOL HYDROCHLORIDE 50 MG: 50 TABLET, FILM COATED ORAL at 14:32

## 2019-12-18 RX ADMIN — GUAIFENESIN AND DEXTROMETHORPHAN HYDROBROMIDE 1 TABLET: 600; 30 TABLET, EXTENDED RELEASE ORAL at 20:04

## 2019-12-18 RX ADMIN — IPRATROPIUM BROMIDE AND ALBUTEROL SULFATE 3 ML: 2.5; .5 SOLUTION RESPIRATORY (INHALATION) at 14:58

## 2019-12-18 RX ADMIN — ENOXAPARIN SODIUM 40 MG: 40 INJECTION SUBCUTANEOUS at 20:03

## 2019-12-18 RX ADMIN — BUDESONIDE 1 MG: 1 SUSPENSION RESPIRATORY (INHALATION) at 07:20

## 2019-12-18 RX ADMIN — PREDNISONE 20 MG: 20 TABLET ORAL at 08:21

## 2019-12-18 RX ADMIN — ASPIRIN 81 MG: 81 TABLET, CHEWABLE ORAL at 08:21

## 2019-12-18 RX ADMIN — CLONAZEPAM 0.25 MG: 0.5 TABLET ORAL at 08:22

## 2019-12-18 RX ADMIN — ARFORMOTEROL TARTRATE 15 MCG: 15 SOLUTION RESPIRATORY (INHALATION) at 17:54

## 2019-12-18 RX ADMIN — ARFORMOTEROL TARTRATE 15 MCG: 15 SOLUTION RESPIRATORY (INHALATION) at 07:15

## 2019-12-18 RX ADMIN — AZITHROMYCIN DIHYDRATE 500 MG: 250 TABLET, FILM COATED ORAL at 17:02

## 2019-12-18 RX ADMIN — CLONAZEPAM 0.25 MG: 0.5 TABLET ORAL at 20:04

## 2019-12-18 RX ADMIN — DOCUSATE SODIUM 100 MG: 100 CAPSULE, LIQUID FILLED ORAL at 08:21

## 2019-12-18 RX ADMIN — AZITHROMYCIN DIHYDRATE 250 MG: 250 TABLET, FILM COATED ORAL at 08:20

## 2019-12-18 RX ADMIN — BUDESONIDE 1 MG: 1 SUSPENSION RESPIRATORY (INHALATION) at 17:59

## 2019-12-18 RX ADMIN — SODIUM CHLORIDE, PRESERVATIVE FREE 10 ML: 5 INJECTION INTRAVENOUS at 09:30

## 2019-12-18 RX ADMIN — HYDROCORTISONE 10 MG: 10 TABLET ORAL at 20:05

## 2019-12-18 RX ADMIN — SODIUM CHLORIDE, PRESERVATIVE FREE 10 ML: 5 INJECTION INTRAVENOUS at 20:04

## 2019-12-18 RX ADMIN — ONDANSETRON 4 MG: 2 INJECTION INTRAMUSCULAR; INTRAVENOUS at 12:43

## 2019-12-18 RX ADMIN — METOCLOPRAMIDE 10 MG: 10 TABLET ORAL at 11:14

## 2019-12-18 RX ADMIN — CALCITONIN SALMON 1 SPRAY: 200 SPRAY, METERED NASAL at 10:47

## 2019-12-18 RX ADMIN — CLOPIDOGREL 75 MG: 75 TABLET, FILM COATED ORAL at 08:20

## 2019-12-18 RX ADMIN — FUROSEMIDE 40 MG: 40 TABLET ORAL at 08:20

## 2019-12-18 RX ADMIN — TRAMADOL HYDROCHLORIDE 50 MG: 50 TABLET, FILM COATED ORAL at 08:19

## 2019-12-18 RX ADMIN — ISOSORBIDE MONONITRATE 30 MG: 30 TABLET ORAL at 08:20

## 2019-12-18 RX ADMIN — IPRATROPIUM BROMIDE AND ALBUTEROL SULFATE 3 ML: 2.5; .5 SOLUTION RESPIRATORY (INHALATION) at 11:20

## 2019-12-18 RX ADMIN — PREDNISONE 20 MG: 20 TABLET ORAL at 17:02

## 2019-12-18 RX ADMIN — INSULIN LISPRO 8 UNITS: 100 INJECTION, SOLUTION INTRAVENOUS; SUBCUTANEOUS at 17:02

## 2019-12-18 RX ADMIN — INSULIN GLARGINE 30 UNITS: 100 INJECTION, SOLUTION SUBCUTANEOUS at 08:21

## 2019-12-18 RX ADMIN — METOCLOPRAMIDE 10 MG: 10 TABLET ORAL at 06:44

## 2019-12-18 RX ADMIN — INSULIN GLARGINE 20 UNITS: 100 INJECTION, SOLUTION SUBCUTANEOUS at 21:52

## 2019-12-18 RX ADMIN — LEVOTHYROXINE SODIUM 88 MCG: 88 TABLET ORAL at 06:44

## 2019-12-18 NOTE — PROGRESS NOTES
Name: Vanessa Colunga ADMIT: 2019   : 1955  PCP: Esha Medley    MRN: [id number] LOS: 5 days   AGE/SEX: 64 y.o. female  ROOM: [facility]     Subjective   Subjective   CC: dyspnea, wheezing  No acute events. Patient states her breathing is a little worse today. She is having a cough but not much sputum production.  No CP/f/c/n/v/d.    Objective   Objective   Vital Signs  Temp:  [97 °F (36.1 °C)-98.4 °F (36.9 °C)] 97.9 °F (36.6 °C)  Heart Rate:  [70-97] 85  Resp:  [14-20] 16  BP: (146-168)/(76-88) 168/88  SpO2:  [95 %-100 %] 95 %  on  Flow (L/min):  [2] 2;   Device (Oxygen Therapy): nasal cannula  Body mass index is 32.42 kg/m².  Physical Exam   Constitutional: She is oriented to person, place, and time. No distress.   HENT:   Head: Normocephalic and atraumatic.   Mouth/Throat: Oropharynx is clear and moist.   Eyes: Pupils are equal, round, and reactive to light. Conjunctivae and EOM are normal.   Neck: Normal range of motion. Neck supple.   Cardiovascular: Normal rate, regular rhythm and intact distal pulses.   Pulmonary/Chest: Effort normal. She has decreased breath sounds in the right lower field and the left lower field. She has wheezes (scattered).   Abdominal: Soft. Bowel sounds are normal. There is no tenderness.   Musculoskeletal: She exhibits no edema or tenderness.   Neurological: She is alert and oriented to person, place, and time.   Skin: Skin is warm and dry. Capillary refill takes less than 2 seconds. She is not diaphoretic.   Psychiatric: She has a normal mood and affect. Her behavior is normal.   Nursing note and vitals reviewed.      Results Review:       I reviewed the patient's new clinical results.  Results from last 7 days   Lab Units 19  0425 19  0638 19  0424 12/15/19  0555   WBC 10*3/mm3 9.34 3.53 12.58* 13.04*   HEMOGLOBIN g/dL 10.8* 10.1* 10.1* 10.3*   PLATELETS 10*3/mm3 136* 139* 141 163     Results from last 7 days   Lab Units 19  0425 19  0638  12/16/19  0424 12/15/19  0555   SODIUM mmol/L 139 142 139 139   POTASSIUM mmol/L 4.1 3.7 4.3 4.3   CHLORIDE mmol/L 100 113* 103 102   CO2 mmol/L 29.4* 19.2* 26.4 25.0   BUN mg/dL 35* 13 40* 39*   CREATININE mg/dL 0.71 0.51* 0.87 1.05*   GLUCOSE mg/dL 86 95 145* 195*   Estimated Creatinine Clearance: 63.8 mL/min (by C-G formula based on SCr of 0.71 mg/dL).  Results from last 7 days   Lab Units 12/13/19  1236   ALBUMIN g/dL 3.40*   BILIRUBIN mg/dL 0.3   ALK PHOS U/L 89   AST (SGOT) U/L 59*   ALT (SGPT) U/L 161*     Results from last 7 days   Lab Units 12/18/19  0425 12/17/19  0638 12/16/19  0424 12/15/19  0555  12/13/19  1236   CALCIUM mg/dL 8.8 7.4* 8.9 8.5*   < > 9.2   ALBUMIN g/dL  --   --   --   --   --  3.40*    < > = values in this interval not displayed.     Results from last 7 days   Lab Units 12/13/19  1830 12/13/19  1614   PROCALCITONIN ng/mL  --  0.12   LACTATE mmol/L 1.0  --      Glucose   Date/Time Value Ref Range Status   12/18/2019 1121 105 70 - 130 mg/dL Final   12/18/2019 0752 90 70 - 130 mg/dL Final   12/18/2019 0627 60 (L) 70 - 130 mg/dL Final   12/17/2019 2120 241 (H) 70 - 130 mg/dL Final   12/17/2019 1620 290 (H) 70 - 130 mg/dL Final   12/17/2019 1057 184 (H) 70 - 130 mg/dL Final   12/17/2019 0620 114 70 - 130 mg/dL Final         allopurinol 100 mg Oral Daily   amLODIPine 10 mg Oral Daily   arformoterol 15 mcg Nebulization BID - RT   aspirin 81 mg Oral Daily   atorvastatin 40 mg Oral Daily   azithromycin 250 mg Oral Q24H   budesonide 1 mg Nebulization BID - RT   calcitonin (salmon) 1 spray Alternating Nares Daily   clonazePAM 0.25 mg Oral BID   clopidogrel 75 mg Oral Daily   docusate sodium 100 mg Oral BID   enoxaparin 40 mg Subcutaneous Q24H   furosemide 40 mg Oral Daily   guaifenesin-dextromethorphan 1 tablet Oral BID   hydrocortisone 10 mg Oral BID   insulin glargine 20 Units Subcutaneous Nightly   insulin glargine 30 Units Subcutaneous Daily With Breakfast   insulin lispro 0-24 Units  Subcutaneous 4x Daily With Meals & Nightly   ipratropium-albuterol 3 mL Nebulization 4x Daily - RT   isosorbide mononitrate 30 mg Oral Daily   levothyroxine 88 mcg Oral Q AM   metoclopramide 10 mg Oral TID AC   predniSONE 20 mg Oral BID With Meals   sodium chloride 10 mL Intravenous Q12H      Diet Regular; Cardiac, Consistent Carbohydrate       Assessment/Plan     Active Hospital Problems    Diagnosis  POA   • Pneumonia of both upper lobes due to infectious organism (CMS/[facility]) [J18.1]  Yes   • Chronic diastolic CHF (congestive heart failure) (CMS/[facility]) [I50.32]  Yes   • Type 2 diabetes mellitus with circulatory disorder, with long-term current use of insulin (CMS/[facility]) [E11.59, Z79.4]  Not Applicable   • Essential hypertension [I10]  Yes   • Chronic respiratory failure with hypoxia and hypercapnia (CMS/[facility]) [J96.11, J96.12]  Yes   • DNR (do not resuscitate) [Z66]  Yes   • History of ESBL E. coli infection [Z86.19]  Yes   • [place]'s disease (CMS/[facility]) [E27.1]  Yes   • Chronic kidney disease, stage III (moderate) (CMS/[facility]) [N18.3]  Yes   • Hypothyroidism (acquired) [E03.9]  Yes   • Gastroparesis diabeticorum (CMS/[facility]) [E11.43, K31.84]  Yes   • Paroxysmal atrial fibrillation (CMS/[facility]) [I48.0]  Yes   • Coronary artery disease involving native coronary artery without angina pectoris [I25.10]  Yes   • COPD with acute exacerbation (CMS/[facility]) [J44.1]  Yes      Resolved Hospital Problems   No resolved problems to display.   COPD Exacerbation  - continue on prednisone and nebulizer treatments  - CXR showing patchy infiltrates-on azithromycin to complete 5 days   - she has chronic interstitial changes on CT scan-repeat CT in 3 months  - appreciate pulm recs    Type 2 DM  - complications include gastroparesis-symptoms have improved  - continue on lantus 30 units QAM and 20 units QPM  - cover with ssi/hypoglycemia protocol    PAF  - appears to be in sinus rhythm  - not on chronic AC    Chronic Diastolic CHF  - appears euvolemic  -  continue oral lasix    Ola's Disease  - continue cortef    Lovenox 40 mg SC daily for DVT prophylaxis.  DNR.  Discussed with patient and nursing staff.  Anticipate discharge home in 1-2 days.      Issac Olson MD  [facility]  12/18/19  1:04 PM

## 2019-12-18 NOTE — PROGRESS NOTES
Continued Stay Note  [facility]     Patient Name: Vanessa Colunga  MRN: [id number]  Today's Date: 12/18/2019    Admit Date: 12/13/2019    Discharge Plan     Row Name 12/18/19 0904       Plan    Plan  [facility]    Patient/Family in Agreement with Plan  yes    Plan Comments  Inbound call from Nephnabor Arreguin. Updated on plan of care. Cr in agreement with [facility]. Awaiting MD disposition on DC. CCP to follow. Ana Maria Rice RN        Discharge Codes    No documentation.             Ana Maria Rice RN

## 2019-12-18 NOTE — PLAN OF CARE
Problem: Patient Care Overview  Goal: Plan of Care Review  Outcome: Ongoing (interventions implemented as appropriate)  Flowsheets (Taken 12/18/2019 0441)  Progress: improving  Plan of Care Reviewed With: patient  Outcome Summary: Purewick to wall suction.  Sats WNL on O2 2L.  Coarse wheezes.

## 2019-12-18 NOTE — PROGRESS NOTES
"      [place] [facility]         Dr Salvador Sayied   LOS: 5 days   Patient Care Team:  Esha Medley as PCP - General (Nurse Practitioner)    Chief Complaint: Acute exacerbation of COPD with suspected ILD caused by respiratory bronchiolitis chronic respiratory failure tobacco abuse diastolic CHF    Interval History: Resting comfortably feels a little bit better today.  Still cough congestion shortness of breath and wheezing ongoing.feels a little worse today    REVIEW OF SYSTEMS:   CARDIOVASCULAR: No chest pain, chest pressure or chest discomfort. No palpitations or edema.   RESPIRATORY: Shortness of breath cough congestion and wheezing  GASTROINTESTINAL: No anorexia, nausea, vomiting or diarrhea. No abdominal pain or blood.   HEMATOLOGIC: No bleeding or bruising.     Ventilator/Non-Invasive Ventilation Settings (From admission, onward)    None            Vital Signs  Temp:  [97 °F (36.1 °C)-98.4 °F (36.9 °C)] 97.9 °F (36.6 °C)  Heart Rate:  [70-97] 85  Resp:  [14-20] 16  BP: (146-168)/(76-88) 168/88    Intake/Output Summary (Last 24 hours) at 12/18/2019 1307  Last data filed at 12/18/2019 1254  Gross per 24 hour   Intake 670 ml   Output 1600 ml   Net -930 ml     Flowsheet Rows      First Filed Value   Admission Height  149.9 cm (59\") Documented at 12/13/2019 1208   Admission Weight  73.1 kg (161 lb 1.6 oz) Documented at 12/13/2019 1208          Physical Exam:   General Appearance:    Alert, cooperative, in no acute distress.  No labored breathing   Lungs:    Diminished breath sounds bilateral wheezing    Heart:    Regular rhythm and normal rate, normal S1 and S2, no            murmur, no gallop, no rub, no click   Chest Wall:    No abnormalities observed   Abdomen:     Normal bowel sounds, no masses, no organomegaly, soft        non-tender, non-distended, no guarding, no rebound                tenderness   Extremities:   Moves all extremities well, no edema, no cyanosis, no             redness     Results " Review:        Results from last 7 days   Lab Units 12/18/19  0425 12/17/19  0638 12/16/19  0424   SODIUM mmol/L 139 142 139   POTASSIUM mmol/L 4.1 3.7 4.3   CHLORIDE mmol/L 100 113* 103   CO2 mmol/L 29.4* 19.2* 26.4   BUN mg/dL 35* 13 40*   CREATININE mg/dL 0.71 0.51* 0.87   GLUCOSE mg/dL 86 95 145*   CALCIUM mg/dL 8.8 7.4* 8.9     Results from last 7 days   Lab Units 12/13/19  2228 12/13/19  1614 12/13/19  1236   TROPONIN T ng/mL <0.010 <0.010 <0.010     Results from last 7 days   Lab Units 12/18/19  0425 12/17/19  0638 12/16/19  0424   WBC 10*3/mm3 9.34 3.53 12.58*   HEMOGLOBIN g/dL 10.8* 10.1* 10.1*   HEMATOCRIT % 32.0* 31.7* 30.7*   PLATELETS 10*3/mm3 136* 139* 141     Results from last 7 days   Lab Units 12/13/19  1303   INR  0.91   APTT seconds 21.4*                       I reviewed the patient's new clinical results.  I personally viewed and interpreted the patient's CXR        Medication Review:     allopurinol 100 mg Oral Daily   amLODIPine 10 mg Oral Daily   arformoterol 15 mcg Nebulization BID - RT   aspirin 81 mg Oral Daily   atorvastatin 40 mg Oral Daily   azithromycin 250 mg Oral Q24H   budesonide 1 mg Nebulization BID - RT   calcitonin (salmon) 1 spray Alternating Nares Daily   clonazePAM 0.25 mg Oral BID   clopidogrel 75 mg Oral Daily   docusate sodium 100 mg Oral BID   enoxaparin 40 mg Subcutaneous Q24H   furosemide 40 mg Oral Daily   guaifenesin-dextromethorphan 1 tablet Oral BID   hydrocortisone 10 mg Oral BID   insulin glargine 20 Units Subcutaneous Nightly   insulin glargine 30 Units Subcutaneous Daily With Breakfast   insulin lispro 0-24 Units Subcutaneous 4x Daily With Meals & Nightly   ipratropium-albuterol 3 mL Nebulization 4x Daily - RT   isosorbide mononitrate 30 mg Oral Daily   levothyroxine 88 mcg Oral Q AM   metoclopramide 10 mg Oral TID AC   predniSONE 20 mg Oral BID With Meals   sodium chloride 10 mL Intravenous Q12H            ASSESSMENT:   Acute exacerbation of COPD  Questionable  pneumonia/interstitial changes/respiratory bronchiolitis  Chronic respiratory failure  Tobacco abuse  Diastolic CHF  Obesity    PLAN:  Still wheezing on my today's exam.  cxray with patchy infiltrates.increase po zithromax 500 mg for 4 more days.  Continue weaning down steroids as tolerated  Ambulate  Wean down oxygen as tolerated  Complete 5 days of Zithromax p.o.  Advised patient to quit smoking long-term  From the CT it appears more interstitial changes suspected to be respiratory bronchiolitis from chronic smoking.  I would recommend follow-up CT chest in 3 months to see progression  Discussed plan of care in detail with the patient  We will follow along    Modesto Montague MD  12/18/19  1:07 PM

## 2019-12-18 NOTE — THERAPY TREATMENT NOTE
Acute Care - Physical Therapy Treatment Note  [facility]     Patient Name: Vanessa Colunga  : 1955  MRN: [id number]  Today's Date: 2019  Onset of Illness/Injury or Date of Surgery: 19  Date of Referral to PT: 19  Referring Physician: DONY)    Admit Date: 2019    Visit Dx:    ICD-10-CM ICD-9-CM   1. Pneumonia of both upper lobes due to infectious organism (CMS/HCC) J18.1 483.8   2. COPD exacerbation (CMS/[facility]) J44.1 491.21   3. Chest pain, unspecified type R07.9 786.50   4. Decreased mobility and endurance Z74.09 780.99     Patient Active Problem List   Diagnosis   • Pneumonia of both upper lobes due to infectious organism (CMS/[facility])   • Chronic diastolic CHF (congestive heart failure) (CMS/[facility])   • Type 2 diabetes mellitus with circulatory disorder, with long-term current use of insulin (CMS/[facility])   • Essential hypertension   • Chronic respiratory failure with hypoxia and hypercapnia (CMS/[facility])   • DNR (do not resuscitate)   • History of ESBL E. coli infection   • [place]'s disease (CMS/HCC)   • Chronic kidney disease, stage III (moderate) (CMS/[facility])   • Hypothyroidism (acquired)   • Gastroparesis diabeticorum (CMS/[facility])   • Paroxysmal atrial fibrillation (CMS/HCC)   • Coronary artery disease involving native coronary artery without angina pectoris   • COPD with acute exacerbation (CMS/[facility])       Therapy Treatment    Rehabilitation Treatment Summary     Row Name 19 1053             Treatment Time/Intention    Discipline  physical therapy assistant  -      Document Type  therapy note (daily note)  -LAURENT      Subjective Information  complains of;weakness;fatigue  -LAURENT      Mode of Treatment  individual therapy;physical therapy  -      Care Plan Review  patient/other agree to care plan  -LAURENT      Comment  required seated rest to complete amb dist  -LAURENT      Existing Precautions/Restrictions  fall;oxygen therapy device and L/min 2L  -JM      Recorded by [LAURENT] Cris Shelley PTA  12/18/19 1056      Row Name 12/18/19 1053             Bed Mobility Assessment/Treatment    Scooting/Bridging [place] (Bed Mobility)  moderate assist (50% patient effort)  -      Supine-Sit [place] (Bed Mobility)  2 person assist;minimum assist (75% patient effort)  -      Assistive Device (Bed Mobility)  draw sheet  -      Recorded by [] Cris Shelley, [facility] 12/18/19 1056      Row Name 12/18/19 1053             Sit-Stand Transfer    Sit-Stand [place] (Transfers)  2 person assist;minimum assist (75% patient effort);contact guard;verbal cues  -      Assistive Device (Sit-Stand Transfers)  walker, front-wheeled  -      Recorded by [] Cris Shelley, [facility] 12/18/19 1056      Row Name 12/18/19 1053             Stand-Sit Transfer    Stand-Sit [place] (Transfers)  contact guard;verbal cues;nonverbal cues (demo/gesture)  -      Assistive Device (Stand-Sit Transfers)  walker, front-wheeled  -      Recorded by [] Cris Shelley, [facility] 12/18/19 1056      Row Name 12/18/19 1053             Gait/Stairs Assessment/Training    [place] Level (Gait)  2 person assist;minimum assist (75% patient effort);contact guard;verbal cues  -      Assistive Device (Gait)  walker, front-wheeled  -      Distance in Feet (Gait)  45, but req seated rest after 30 ft  -      Deviations/Abnormal Patterns (Gait)  stride length decreased  -      Bilateral Gait Deviations  forward flexed posture;weight shift ability decreased  -      Recorded by [] Cris Shelley, [facility] 12/18/19 1056      Row Name 12/18/19 1053             Therapeutic Exercise    Lower Extremity (Therapeutic Exercise)  LAQ (long arc quad), bilateral;marching while seated  -      Lower Extremity Range of Motion (Therapeutic Exercise)  ankle dorsiflexion/plantar flexion, bilateral  -      Sets/Reps (Therapeutic Exercise)  10 reps, cues to keep on task  -      Recorded by [] Cris Shelley, [facility] 12/18/19 1056      Row Name  12/18/19 1053             Positioning and Restraints    Pre-Treatment Position  in bed  -      In Chair  reclined;call light within reach;encouraged to call for assist;exit alarm on;notified nsg;heels elevated  -      Recorded by [] Cris Shelley PTA 12/18/19 1056      Row Name 12/18/19 1053             Pain Scale: Numbers Pre/Post-Treatment    Pain Scale: Numbers, Pretreatment  0/10 - no pain  -      Pain Scale: Numbers, Post-Treatment  0/10 - no pain  -      Pain Location - Orientation  --  -      Pain Intervention(s)  Medication (See MAR)  -      Recorded by [LAURENT] Cris Shelley PTA 12/18/19 1056        User Key  (r) = Recorded By, (t) = Taken By, (c) = Cosigned By    Initials Name Effective Dates Discipline    Cris Jhaveri PTA 03/07/18 -  PT                       PT Recommendation and Plan     Plan of Care Reviewed With: patient  Progress: improving  Outcome Summary: incr amb dist, but req seated rest to complete due to LE wkness, added LE exer to rx session today; [place] SNU [place]  Outcome Measures     Row Name 12/17/19 1800             How much help from another person do you currently need...    Turning from your back to your side while in flat bed without using bedrails?  3  -JM      Moving from lying on back to sitting on the side of a flat bed without bedrails?  2  -JM      Moving to and from a bed to a chair (including a wheelchair)?  3  -JM      Standing up from a chair using your arms (e.g., wheelchair, bedside chair)?  3  -JM      Climbing 3-5 steps with a railing?  1  -JM      To walk in hospital room?  3  -JM      AM-PAC 6 Clicks Score (PT)  15  -        User Key  (r) = Recorded By, (t) = Taken By, (c) = Cosigned By    Initials Name Provider Type    Cris Jhaveri PTA Physical Therapy Assistant         Time Calculation:   PT Charges     Row Name 12/18/19 1051             Time Calculation    Start Time  1015  -      Stop Time  1038  -      Time Calculation (min)   23 min  -LAURENT      PT Received On  12/18/19  -LAURENT      PT - Next Appointment  12/19/19  -LAURENT        User Key  (r) = Recorded By, (t) = Taken By, (c) = Cosigned By    Initials Name Provider Type    Cris Jhaveri PTA Physical Therapy Assistant        Therapy Charges for Today     Code Description Service Date Service Provider Modifiers Qty    27962749442 HC PT THER PROC EA 15 MIN 12/17/2019 Cris Shelley PTA GP 1    4119532 HC PT THER SUPP EA 15 MIN 12/17/2019 Cris Shelley PTA GP 1    97080564825 HC PT THER PROC EA 15 MIN 12/18/2019 Cris Shelley PTA GP 2    80619232552 HC PT THER SUPP EA 15 MIN 12/18/2019 Cris Shelley, [place] GP 2          PT G-Codes  Outcome Measure Options: AM-PAC 6 Clicks Basic Mobility (PT)  AM-PAC 6 Clicks Score (PT): 17    Cris Shelley PTA  12/18/2019

## 2019-12-18 NOTE — PLAN OF CARE
Problem: Patient Care Overview  Goal: Plan of Care Review  Outcome: Ongoing (interventions implemented as appropriate)  Flowsheets (Taken 12/18/2019 1644)  Progress: improving  Plan of Care Reviewed With: patient  Outcome Summary: Patient has had PRN pain medication twice this shift and Zofran once. PRN meds has been effective, sat up in chair earliier today.  Lungs continue with expiratory wheezing.  MD here earlier and new Azithromycin increased to 500 mg for next 4 days. Last blood sugar today was 226.  No s/s of distress noted.

## 2019-12-18 NOTE — PLAN OF CARE
Problem: Patient Care Overview  Goal: Plan of Care Review  Outcome: Ongoing (interventions implemented as appropriate)  Flowsheets (Taken 12/18/2019 1056)  Progress: improving  Plan of Care Reviewed With: patient  Outcome Summary: incr amb dist, but req seated rest to complete due to LE wkness, added LE exer to rx session today; plans SNU at [place]

## 2019-12-19 LAB
ANION GAP SERPL CALCULATED.3IONS-SCNC: 10 MMOL/L (ref 5–15)
BUN BLD-MCNC: 28 MG/DL (ref 8–23)
BUN/CREAT SERPL: 48.3 (ref 7–25)
CALCIUM SPEC-SCNC: 8.7 MG/DL (ref 8.6–10.5)
CHLORIDE SERPL-SCNC: 99 MMOL/L (ref 98–107)
CO2 SERPL-SCNC: 30 MMOL/L (ref 22–29)
CREAT BLD-MCNC: 0.58 MG/DL (ref 0.57–1)
GFR SERPL CREATININE-BSD FRML MDRD: 104 ML/MIN/1.73
GLUCOSE BLD-MCNC: 92 MG/DL (ref 65–99)
GLUCOSE BLDC GLUCOMTR-MCNC: 157 MG/DL (ref 70–130)
GLUCOSE BLDC GLUCOMTR-MCNC: 176 MG/DL (ref 70–130)
GLUCOSE BLDC GLUCOMTR-MCNC: 204 MG/DL (ref 70–130)
GLUCOSE BLDC GLUCOMTR-MCNC: 254 MG/DL (ref 70–130)
GLUCOSE BLDC GLUCOMTR-MCNC: 69 MG/DL (ref 70–130)
POTASSIUM BLD-SCNC: 4.4 MMOL/L (ref 3.5–5.2)
SODIUM BLD-SCNC: 139 MMOL/L (ref 136–145)

## 2019-12-19 PROCEDURE — 82962 GLUCOSE BLOOD TEST: CPT

## 2019-12-19 PROCEDURE — 94799 UNLISTED PULMONARY SVC/PX: CPT

## 2019-12-19 PROCEDURE — 97110 THERAPEUTIC EXERCISES: CPT

## 2019-12-19 PROCEDURE — 36415 COLL VENOUS BLD VENIPUNCTURE: CPT | Performed by: INTERNAL MEDICINE

## 2019-12-19 PROCEDURE — 63710000001 INSULIN LISPRO (HUMAN) PER 5 UNITS: Performed by: INTERNAL MEDICINE

## 2019-12-19 PROCEDURE — 63710000001 PREDNISONE PER 1 MG: Performed by: INTERNAL MEDICINE

## 2019-12-19 PROCEDURE — 63710000001 INSULIN GLARGINE PER 5 UNITS: Performed by: INTERNAL MEDICINE

## 2019-12-19 PROCEDURE — 80048 BASIC METABOLIC PNL TOTAL CA: CPT | Performed by: INTERNAL MEDICINE

## 2019-12-19 RX ORDER — DEXTROSE MONOHYDRATE 25 G/50ML
25 INJECTION, SOLUTION INTRAVENOUS
Status: DISCONTINUED | OUTPATIENT
Start: 2019-12-19 | End: 2019-12-21 | Stop reason: HOSPADM

## 2019-12-19 RX ORDER — SODIUM CHLORIDE FOR INHALATION 7 %
4 VIAL, NEBULIZER (ML) INHALATION
Status: DISCONTINUED | OUTPATIENT
Start: 2019-12-19 | End: 2019-12-21 | Stop reason: HOSPADM

## 2019-12-19 RX ORDER — NICOTINE POLACRILEX 4 MG
15 LOZENGE BUCCAL
Status: DISCONTINUED | OUTPATIENT
Start: 2019-12-19 | End: 2019-12-21 | Stop reason: HOSPADM

## 2019-12-19 RX ADMIN — HYDROCORTISONE 10 MG: 10 TABLET ORAL at 09:02

## 2019-12-19 RX ADMIN — INSULIN LISPRO 4 UNITS: 100 INJECTION, SOLUTION INTRAVENOUS; SUBCUTANEOUS at 09:00

## 2019-12-19 RX ADMIN — HYDROCORTISONE 10 MG: 10 TABLET ORAL at 20:31

## 2019-12-19 RX ADMIN — PREDNISONE 20 MG: 20 TABLET ORAL at 09:04

## 2019-12-19 RX ADMIN — IPRATROPIUM BROMIDE AND ALBUTEROL SULFATE 3 ML: 2.5; .5 SOLUTION RESPIRATORY (INHALATION) at 10:34

## 2019-12-19 RX ADMIN — CLONAZEPAM 0.25 MG: 0.5 TABLET ORAL at 09:01

## 2019-12-19 RX ADMIN — PREDNISONE 20 MG: 20 TABLET ORAL at 17:23

## 2019-12-19 RX ADMIN — CALCITONIN SALMON 1 SPRAY: 200 SPRAY, METERED NASAL at 09:05

## 2019-12-19 RX ADMIN — IPRATROPIUM BROMIDE AND ALBUTEROL SULFATE 3 ML: 2.5; .5 SOLUTION RESPIRATORY (INHALATION) at 14:44

## 2019-12-19 RX ADMIN — INSULIN LISPRO 8 UNITS: 100 INJECTION, SOLUTION INTRAVENOUS; SUBCUTANEOUS at 20:30

## 2019-12-19 RX ADMIN — METOCLOPRAMIDE 10 MG: 10 TABLET ORAL at 17:23

## 2019-12-19 RX ADMIN — METOCLOPRAMIDE 10 MG: 10 TABLET ORAL at 06:39

## 2019-12-19 RX ADMIN — INSULIN LISPRO 5 UNITS: 100 INJECTION, SOLUTION INTRAVENOUS; SUBCUTANEOUS at 17:23

## 2019-12-19 RX ADMIN — TRAMADOL HYDROCHLORIDE 50 MG: 50 TABLET, FILM COATED ORAL at 20:31

## 2019-12-19 RX ADMIN — ASPIRIN 81 MG: 81 TABLET, CHEWABLE ORAL at 09:06

## 2019-12-19 RX ADMIN — FUROSEMIDE 40 MG: 40 TABLET ORAL at 09:02

## 2019-12-19 RX ADMIN — ISOSORBIDE MONONITRATE 30 MG: 30 TABLET ORAL at 09:02

## 2019-12-19 RX ADMIN — SODIUM CHLORIDE, PRESERVATIVE FREE 10 ML: 5 INJECTION INTRAVENOUS at 09:10

## 2019-12-19 RX ADMIN — INSULIN GLARGINE 30 UNITS: 100 INJECTION, SOLUTION SUBCUTANEOUS at 08:59

## 2019-12-19 RX ADMIN — TRAMADOL HYDROCHLORIDE 50 MG: 50 TABLET, FILM COATED ORAL at 09:10

## 2019-12-19 RX ADMIN — GUAIFENESIN AND DEXTROMETHORPHAN HYDROBROMIDE 1 TABLET: 600; 30 TABLET, EXTENDED RELEASE ORAL at 20:31

## 2019-12-19 RX ADMIN — SODIUM CHLORIDE 4 ML: 7 NEBU SOLN,3 % NEBU at 06:54

## 2019-12-19 RX ADMIN — AMLODIPINE BESYLATE 10 MG: 10 TABLET ORAL at 09:04

## 2019-12-19 RX ADMIN — BUDESONIDE 1 MG: 1 SUSPENSION RESPIRATORY (INHALATION) at 06:45

## 2019-12-19 RX ADMIN — DOCUSATE SODIUM 100 MG: 100 CAPSULE, LIQUID FILLED ORAL at 20:31

## 2019-12-19 RX ADMIN — SODIUM CHLORIDE 4 ML: 7 NEBU SOLN,3 % NEBU at 21:00

## 2019-12-19 RX ADMIN — ALLOPURINOL 100 MG: 100 TABLET ORAL at 09:04

## 2019-12-19 RX ADMIN — METOCLOPRAMIDE 10 MG: 10 TABLET ORAL at 11:51

## 2019-12-19 RX ADMIN — SODIUM CHLORIDE, PRESERVATIVE FREE 10 ML: 5 INJECTION INTRAVENOUS at 20:31

## 2019-12-19 RX ADMIN — ATORVASTATIN CALCIUM 40 MG: 20 TABLET, FILM COATED ORAL at 09:02

## 2019-12-19 RX ADMIN — GUAIFENESIN AND DEXTROMETHORPHAN HYDROBROMIDE 1 TABLET: 600; 30 TABLET, EXTENDED RELEASE ORAL at 09:08

## 2019-12-19 RX ADMIN — CLOPIDOGREL 75 MG: 75 TABLET, FILM COATED ORAL at 09:02

## 2019-12-19 RX ADMIN — ARFORMOTEROL TARTRATE 15 MCG: 15 SOLUTION RESPIRATORY (INHALATION) at 21:00

## 2019-12-19 RX ADMIN — AZITHROMYCIN DIHYDRATE 500 MG: 250 TABLET, FILM COATED ORAL at 17:23

## 2019-12-19 RX ADMIN — ARFORMOTEROL TARTRATE 15 MCG: 15 SOLUTION RESPIRATORY (INHALATION) at 06:45

## 2019-12-19 RX ADMIN — CLONAZEPAM 0.25 MG: 0.5 TABLET ORAL at 20:30

## 2019-12-19 RX ADMIN — LEVOTHYROXINE SODIUM 88 MCG: 88 TABLET ORAL at 05:53

## 2019-12-19 NOTE — PLAN OF CARE
Problem: Patient Care Overview  Goal: Plan of Care Review  Outcome: Ongoing (interventions implemented as appropriate)  Flowsheets (Taken 12/19/2019 7368)  Plan of Care Reviewed With: patient  Outcome Summary: pt still req assist of 2 and seated rest to complete safe amb; agreeable to chair more today; plans SNU at [place]

## 2019-12-19 NOTE — PLAN OF CARE
Problem: Patient Care Overview  Goal: Plan of Care Review  Outcome: Ongoing (interventions implemented as appropriate)     PT RESTED WELL THRU THE NIGHT, EASILY AWAKENS WITH STIMULATION, TURN Q2HRS TO SIDES, SKIN INTACT, NO C/O PAIN OR OTHER DISCOMFORTS. LUNG SOUNDS REMAINS COARSE WITH RHONCHI. O2 AT 2LPM/NC, SATS ABOVE 92%. NSR ON MONITOR. NAD, VSS. CTM.

## 2019-12-19 NOTE — THERAPY TREATMENT NOTE
Acute Care - Physical Therapy Treatment Note  [facility]     Patient Name: Vanessa Colunga  : 1955  MRN: [id number]  Today's Date: 2019  Onset of Illness/Injury or Date of Surgery: 19  Date of Referral to PT: 19  Referring Physician: DONY)    Admit Date: 2019    Visit Dx:    ICD-10-CM ICD-9-CM   1. Pneumonia of both upper lobes due to infectious organism (CMS/[facility]) J18.1 483.8   2. COPD exacerbation (CMS/[facility]) J44.1 491.21   3. Chest pain, unspecified type R07.9 786.50   4. Decreased mobility and endurance Z74.09 780.99     Patient Active Problem List   Diagnosis   • Pneumonia of both upper lobes due to infectious organism (CMS/[facility])   • Chronic diastolic CHF (congestive heart failure) (CMS/[facility])   • Type 2 diabetes mellitus with circulatory disorder, with long-term current use of insulin (CMS/[facility])   • Essential hypertension   • Chronic respiratory failure with hypoxia and hypercapnia (CMS/[facility])   • DNR (do not resuscitate)   • History of ESBL E. coli infection   • [place]'s disease (CMS/[facility])   • Chronic kidney disease, stage III (moderate) (CMS/[facility])   • Hypothyroidism (acquired)   • Gastroparesis diabeticorum (CMS/[facility])   • Paroxysmal atrial fibrillation (CMS/[facility])   • Coronary artery disease involving native coronary artery without angina pectoris   • COPD with acute exacerbation (CMS/[facility])       Therapy Treatment    Rehabilitation Treatment Summary     Row Name 19 2040             Treatment Time/Intention    Discipline  physical therapy assistant  -LAURENT      Document Type  therapy note (daily note)  -LAURENT      Subjective Information  complains of;weakness;fatigue;pain  -      Mode of Treatment  individual therapy;physical therapy  -      Care Plan Review  patient/other agree to care plan  -      Existing Precautions/Restrictions  fall;oxygen therapy device and L/min 2L  -LAURENT      Recorded by [LAURENT] Cris Shelley PTA 19 1312      Row Name 19 1304             Vital  Signs    Pretreatment Heart Rate (beats/min)  109  -JM      Intratreatment Heart Rate (beats/min)  137  -JM      Posttreatment Heart Rate (beats/min)  112  -JM      Pre SpO2 (%)  94  -JM      O2 Delivery Pre Treatment  supplemental O2  -JM      Intra SpO2 (%)  91  -JM      O2 Delivery Intra Treatment  supplemental O2  -JM      Post SpO2 (%)  95  -JM      O2 Delivery Post Treatment  supplemental O2  -JM      Recorded by [JM] Cris Shelley, PTA 12/19/19 1312      Row Name 12/19/19 1305             Bed Mobility Assessment/Treatment    Scooting/Bridging [place] (Bed Mobility)  moderate assist (50% patient effort)  -      Assistive Device (Bed Mobility)  draw sheet  -      Recorded by [] Cris Shelley, [facility] 12/19/19 1312      Row Name 12/19/19 1305             Sit-Stand Transfer    Sit-Stand [place] (Transfers)  2 person assist;minimum assist (75% patient effort);contact guard;verbal cues  -      Assistive Device (Sit-Stand Transfers)  walker, front-wheeled  -      Recorded by [] Cris Shelley, [facility] 12/19/19 1312      Row Name 12/19/19 1305             Stand-Sit Transfer    Stand-Sit [place] (Transfers)  contact guard;verbal cues;nonverbal cues (demo/gesture)  -      Assistive Device (Stand-Sit Transfers)  walker, front-wheeled  -      Recorded by [] Cris Shelley, [facility] 12/19/19 1312      Row Name 12/19/19 1303             Gait/Stairs Assessment/Training    [place] Level (Gait)  2 person assist;minimum assist (75% patient effort);contact guard;verbal cues  -      Assistive Device (Gait)  walker, front-wheeled  -      Distance in Feet (Gait)  45, req seated rest after 20 ft  -      Deviations/Abnormal Patterns (Gait)  stride length decreased  -      Bilateral Gait Deviations  forward flexed posture;weight shift ability decreased  -      Recorded by [] Cris Shelley, [facility] 12/19/19 1312      Row Name 12/19/19 1305             Positioning and Restraints     Pre-Treatment Position  in bed  -JM      In Chair  reclined;call light within reach;encouraged to call for assist;exit alarm on;notified nsg  -LAURENT      Recorded by [LAURENT] Cris Shelley PTA 12/19/19 8535      Row Name 12/19/19 1305             Pain Scale: Numbers Pre/Post-Treatment    Pain Scale: Numbers, Pretreatment  0/10 - no pain  -LAURENT      Pain Scale: Numbers, Post-Treatment  0/10 - no pain  -LAURENT      Recorded by [LAURENT] Cris Shelley PTA 12/19/19 1312        User Key  (r) = Recorded By, (t) = Taken By, (c) = Cosigned By    Initials Name Effective Dates Discipline    Cris Jhaveri PTA 03/07/18 -  PT                       PT Recommendation and Plan     Plan of Care Reviewed With: patient  Progress: improving  Outcome Summary: pt still req assist of 2 and seated rest to complete safe amb; agreeable to chair more today; plans SNU at [place]  Outcome Measures     Row Name 12/17/19 1800             How much help from another person do you currently need...    Turning from your back to your side while in flat bed without using bedrails?  3  -JM      Moving from lying on back to sitting on the side of a flat bed without bedrails?  2  -JM      Moving to and from a bed to a chair (including a wheelchair)?  3  -JM      Standing up from a chair using your arms (e.g., wheelchair, bedside chair)?  3  -JM      Climbing 3-5 steps with a railing?  1  -JM      To walk in hospital room?  3  -JM      AM-PAC 6 Clicks Score (PT)  15  -        User Key  (r) = Recorded By, (t) = Taken By, (c) = Cosigned By    Initials Name Provider Type    Cris Jhaveri PTA Physical Therapy Assistant         Time Calculation:   PT Charges     Row Name 12/19/19 1304             Time Calculation    Start Time  1023  -      Stop Time  1051  -LAURENT      Time Calculation (min)  28 min  -LAURENT      PT Received On  12/19/19  -LAURENT      PT - Next Appointment  12/20/19  -LAURENT        User Key  (r) = Recorded By, (t) = Taken By, (c) = Cosigned By    Initials  Name Provider Type     Cris Shelley PTA Physical Therapy Assistant        Therapy Charges for Today     Code Description Service Date Service Provider Modifiers Qty    99438156912 HC PT THER PROC EA 15 MIN 12/18/2019 Cris Shelley PTA GP 2    74496497741 HC PT THER SUPP EA 15 MIN 12/18/2019 Cris Shelley PTA GP 2    92078631707 HC PT THER PROC EA 15 MIN 12/19/2019 Cris Shelley PTA GP 2    59393451651 HC PT THER SUPP EA 15 MIN 12/19/2019 Cris Shelley PTA GP 2          PT G-Codes  Outcome Measure Options: AM-PAC 6 Clicks Basic Mobility (PT)  AM-PAC 6 Clicks Score (PT): 16    Cris Shelley PTA  12/19/2019

## 2019-12-19 NOTE — PROGRESS NOTES
Continued Stay Note  [facility]     Patient Name: Vanessa Colunga  MRN: [id number]  Today's Date: 12/19/2019    Admit Date: 12/13/2019    Discharge Plan     Row Name 12/19/19 1348       Plan    Plan  [facility]    Patient/Family in Agreement with Plan  yes    Plan Comments  Spoke with Erich at [facility]. Patient is able to admit tomorrow if medically stable. Awaiting MD dispotion for DC Friday. Ana Maria Rice RN        Discharge Codes    No documentation.       Expected Discharge Date and Time     Expected Discharge Date Expected Discharge Time    Dec 20, 2019             Ana Maria Rice RN

## 2019-12-19 NOTE — PLAN OF CARE
Problem: Patient Care Overview  Goal: Plan of Care Review  Outcome: Ongoing (interventions implemented as appropriate)  Flowsheets (Taken 12/19/2019 2953)  Outcome Summary: Patient is resting in room. Makes needs known. Last blood sugar was 204 today, PRN tramadol once today. Patient has had no vomiting episodes today. Continues to work with therapy, ambulated in [place] and sat in chair some today.  Patient possibly being discharged tomorrow or Saturday.

## 2019-12-19 NOTE — PROGRESS NOTES
Name: Vanessa Colunga ADMIT: 2019   : 1955  PCP: Esha Medley    MRN: [id number] LOS: 6 days   AGE/SEX: 64 y.o. female  ROOM: [facility]     Subjective   Subjective   CC: dyspnea, wheezing  No acute events. Patient feels about the same. She is having a cough which is more productive.  She coughed up about a quarter-sized blood clot today.  No CP/f/c/n/v/d.    Objective   Objective   Vital Signs  Temp:  [97.6 °F (36.4 °C)-98.4 °F (36.9 °C)] 98.4 °F (36.9 °C)  Heart Rate:  [69-99] 86  Resp:  [16-18] 18  BP: (127-177)/(72-85) 127/73  SpO2:  [95 %-100 %] 97 %  on  Flow (L/min):  [2] 2;   Device (Oxygen Therapy): nasal cannula  Body mass index is 32.74 kg/m².  Physical Exam   Constitutional: She is oriented to person, place, and time. No distress.   HENT:   Head: Normocephalic and atraumatic.   Mouth/Throat: Oropharynx is clear and moist.   Eyes: Pupils are equal, round, and reactive to light. Conjunctivae and EOM are normal.   Neck: Normal range of motion. Neck supple.   Cardiovascular: Normal rate, regular rhythm and intact distal pulses.   Pulmonary/Chest: Effort normal. She has decreased breath sounds in the right lower field and the left lower field. She has wheezes (scattered).   Abdominal: Soft. Bowel sounds are normal. There is no tenderness.   Musculoskeletal: She exhibits no edema or tenderness.   Neurological: She is alert and oriented to person, place, and time.   Skin: Skin is warm and dry. Capillary refill takes less than 2 seconds. She is not diaphoretic.   Psychiatric: She has a normal mood and affect. Her behavior is normal.   Nursing note and vitals reviewed.      Results Review:       I reviewed the patient's new clinical results.  Results from last 7 days   Lab Units 19  0425 19  0638 19  0424 12/15/19  0555   WBC 10*3/mm3 9.34 3.53 12.58* 13.04*   HEMOGLOBIN g/dL 10.8* 10.1* 10.1* 10.3*   PLATELETS 10*3/mm3 136* 139* 141 163     Results from last 7 days   Lab Units  12/19/19  0924 12/18/19  0425 12/17/19  0638 12/16/19  0424   SODIUM mmol/L 139 139 142 139   POTASSIUM mmol/L 4.4 4.1 3.7 4.3   CHLORIDE mmol/L 99 100 113* 103   CO2 mmol/L 30.0* 29.4* 19.2* 26.4   BUN mg/dL 28* 35* 13 40*   CREATININE mg/dL 0.58 0.71 0.51* 0.87   GLUCOSE mg/dL 92 86 95 145*   Estimated Creatinine Clearance: 64.1 mL/min (by C-G formula based on SCr of 0.58 mg/dL).  Results from last 7 days   Lab Units 12/13/19  1236   ALBUMIN g/dL 3.40*   BILIRUBIN mg/dL 0.3   ALK PHOS U/L 89   AST (SGOT) U/L 59*   ALT (SGPT) U/L 161*     Results from last 7 days   Lab Units 12/19/19  0924 12/18/19  0425 12/17/19  0638 12/16/19  0424  12/13/19  1236   CALCIUM mg/dL 8.7 8.8 7.4* 8.9   < > 9.2   ALBUMIN g/dL  --   --   --   --   --  3.40*    < > = values in this interval not displayed.     Results from last 7 days   Lab Units 12/13/19  1830 12/13/19  1614   PROCALCITONIN ng/mL  --  0.12   LACTATE mmol/L 1.0  --      Glucose   Date/Time Value Ref Range Status   12/19/2019 1116 69 (L) 70 - 130 mg/dL Final   12/19/2019 0614 157 (H) 70 - 130 mg/dL Final   12/18/2019 2013 188 (H) 70 - 130 mg/dL Final   12/18/2019 1603 226 (H) 70 - 130 mg/dL Final   12/18/2019 1121 105 70 - 130 mg/dL Final   12/18/2019 0752 90 70 - 130 mg/dL Final   12/18/2019 0627 60 (L) 70 - 130 mg/dL Final         allopurinol 100 mg Oral Daily   amLODIPine 10 mg Oral Daily   arformoterol 15 mcg Nebulization BID - RT   aspirin 81 mg Oral Daily   atorvastatin 40 mg Oral Daily   azithromycin 500 mg Oral Q24H   budesonide 1 mg Nebulization BID - RT   calcitonin (salmon) 1 spray Alternating Nares Daily   clonazePAM 0.25 mg Oral BID   clopidogrel 75 mg Oral Daily   docusate sodium 100 mg Oral BID   enoxaparin 40 mg Subcutaneous Q24H   furosemide 40 mg Oral Daily   guaifenesin-dextromethorphan 1 tablet Oral BID   hydrocortisone 10 mg Oral BID   insulin glargine 20 Units Subcutaneous Nightly   insulin glargine 30 Units Subcutaneous Daily With Breakfast    insulin lispro 0-24 Units Subcutaneous 4x Daily With Meals & Nightly   ipratropium-albuterol 3 mL Nebulization 4x Daily - RT   isosorbide mononitrate 30 mg Oral Daily   levothyroxine 88 mcg Oral Q AM   metoclopramide 10 mg Oral TID AC   predniSONE 20 mg Oral BID With Meals   sodium chloride 10 mL Intravenous Q12H   sodium chloride 4 mL Nebulization BID - RT      Diet Regular; Cardiac, Consistent Carbohydrate       Assessment/Plan     Active Hospital Problems    Diagnosis  POA   • Pneumonia of both upper lobes due to infectious organism (CMS/[facility]) [J18.1]  Yes   • Chronic diastolic CHF (congestive heart failure) (CMS/[facility]) [I50.32]  Yes   • Type 2 diabetes mellitus with circulatory disorder, with long-term current use of insulin (CMS/[facility]) [E11.59, Z79.4]  Not Applicable   • Essential hypertension [I10]  Yes   • Chronic respiratory failure with hypoxia and hypercapnia (CMS/[facility]) [J96.11, J96.12]  Yes   • DNR (do not resuscitate) [Z66]  Yes   • History of ESBL E. 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]  Yes   • Chronic kidney disease, stage III (moderate) (CMS/[facility]) [N18.3]  Yes   • Hypothyroidism (acquired) [E03.9]  Yes   • Gastroparesis diabeticorum (CMS/[facility]) [E11.43, K31.84]  Yes   • Paroxysmal atrial fibrillation (CMS/[facility]) [I48.0]  Yes   • Coronary artery disease involving native coronary artery without angina pectoris [I25.10]  Yes   • COPD with acute exacerbation (CMS/[facility]) [J44.1]  Yes      Resolved Hospital Problems   No resolved problems to display.   COPD Exacerbation  - continue on prednisone and nebulizer treatments, hypertonic saline nebs added  - CXR showing patchy infiltrates-on azithromycin to complete 5 days   - having some hemoptysis probably from bronchitis/PNA (no epistaxis, CTA 12/13 negative for PE), will monitor-stop lovenox for now  - she has chronic interstitial changes on CT scan-repeat CT in 3 months  - appreciate pulm recs, d/w Dr. Enoc Vidal    Type 2 DM  -  complications include gastroparesis-symptoms have improved  - having some hypoglycemia  - continue on lantus 30 units QAM, stop lantus 20 units QPM  - cover with ssi/hypoglycemia protocol but will decrease dose to moderate-high    PAF  - appears to be in sinus rhythm  - not on chronic AC    Chronic Diastolic CHF  - appears euvolemic  - continue oral lasix    Lawndale's Disease  - continue cortef    SCDs for DVT prophylaxis.  DNR.  Discussed with patient and nursing staff.  Anticipate discharge home in 1-2 days.      Issac Olson MD  [facility]  12/19/19  3:09 PM

## 2019-12-19 NOTE — PROGRESS NOTES
[facility]     Mar/chart reviewed  Follow up copd with ae, abnormal CT chest  She says she is still weak and has cough, still difficult to ambulate.  She confirms she has frequent acute exacerbations, she was just in [facility] in October.  Per ct report sounds like she had a similar ct then and had bronch done.  She says she lives alone, she is not back to baseline she says.  She was oob and worked with PT yesterday    Vital Sign Min/Max for last 24 hours  Temp  Min: 97.7 °F (36.5 °C)  Max: 98.4 °F (36.9 °C)   BP  Min: 160/80  Max: 174/85   Pulse  Min: 70  Max: 101   Resp  Min: 16  Max: 20   SpO2  Min: 95 %  Max: 100 %   Flow (L/min)  Min: 2  Max: 2   Weight  Min: 73.5 kg (162 lb 1.6 oz)  Max: 73.5 kg (162 lb 1.6 oz)     Appears ill, flat affect, axox3,   perrl, eomi, no icterus, no palpable thyroid nodules, normal conjunctiva.  mmm, no jvd, trachea midline, neck supple,  chest fair ae, coarse, rhonchi bilaterally, no crackles, + wheezes,   rrr,   soft, nt, nd +bs,  no c/c/trace edema  Skin warm, dry no rashes    Labs: 12/19: reviewed:  No new labs    Reviewed prior labs and prior CT chest  Reviewed records in [facility]    A/P:  1. COPD with ae -- on oral steroids now and continue nebulized bronchodilators. Will add some clearance therapy as she has quite a bit of rhonchi still.  Suspect she has rather severe disease and she seems to have frequent acute exacerbations.  2. Pneumonitis/abnormal CT chest -- has been bronched in the past ct abnormal in October as well, changes not terribly severe.  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  She is not currently a candidate for open lung biopsy or more aggressive therapy.  I would recommend repeat ct chest in the future possibly 6 weeks to ensure resolution, but would tailor timing to clinical situation on follow up.  3. Chronic hypoxemic respiratory failure -- continue oxygen, seems at baseline oxygen requirement now.  4. Tobacco abuse -- she  needs to quit smoking  5. GERD and history of dysphagia -- this could be contributing to her frequent acute exacerbations as well.  6. DMII  7. Obesity -- she is unaware of any outpatient sleep eval or history of sleep apnea  8. Chronic diastolic chf    She appears to be approaching discharge readiness.  Will try and increase her pulmonary toilet some    This patient was new to me today, chart work up thus far reviewed

## 2019-12-20 LAB
ANION GAP SERPL CALCULATED.3IONS-SCNC: 9.3 MMOL/L (ref 5–15)
BUN BLD-MCNC: 27 MG/DL (ref 8–23)
BUN/CREAT SERPL: 45 (ref 7–25)
CALCIUM SPEC-SCNC: 8.7 MG/DL (ref 8.6–10.5)
CHLORIDE SERPL-SCNC: 101 MMOL/L (ref 98–107)
CO2 SERPL-SCNC: 29.7 MMOL/L (ref 22–29)
CREAT BLD-MCNC: 0.6 MG/DL (ref 0.57–1)
GFR SERPL CREATININE-BSD FRML MDRD: 100 ML/MIN/1.73
GLUCOSE BLD-MCNC: 66 MG/DL (ref 65–99)
GLUCOSE BLDC GLUCOMTR-MCNC: 131 MG/DL (ref 70–130)
GLUCOSE BLDC GLUCOMTR-MCNC: 143 MG/DL (ref 70–130)
GLUCOSE BLDC GLUCOMTR-MCNC: 243 MG/DL (ref 70–130)
GLUCOSE BLDC GLUCOMTR-MCNC: 266 MG/DL (ref 70–130)
GLUCOSE BLDC GLUCOMTR-MCNC: 54 MG/DL (ref 70–130)
GLUCOSE BLDC GLUCOMTR-MCNC: 68 MG/DL (ref 70–130)
POTASSIUM BLD-SCNC: 4.3 MMOL/L (ref 3.5–5.2)
SODIUM BLD-SCNC: 140 MMOL/L (ref 136–145)

## 2019-12-20 PROCEDURE — 36415 COLL VENOUS BLD VENIPUNCTURE: CPT | Performed by: INTERNAL MEDICINE

## 2019-12-20 PROCEDURE — 63710000001 INSULIN GLARGINE PER 5 UNITS: Performed by: INTERNAL MEDICINE

## 2019-12-20 PROCEDURE — 82962 GLUCOSE BLOOD TEST: CPT

## 2019-12-20 PROCEDURE — 80048 BASIC METABOLIC PNL TOTAL CA: CPT | Performed by: INTERNAL MEDICINE

## 2019-12-20 PROCEDURE — 63710000001 INSULIN LISPRO (HUMAN) PER 5 UNITS: Performed by: INTERNAL MEDICINE

## 2019-12-20 PROCEDURE — 63710000001 PREDNISONE PER 1 MG: Performed by: INTERNAL MEDICINE

## 2019-12-20 PROCEDURE — 25010000002 ONDANSETRON PER 1 MG: Performed by: INTERNAL MEDICINE

## 2019-12-20 PROCEDURE — 94799 UNLISTED PULMONARY SVC/PX: CPT

## 2019-12-20 PROCEDURE — 97110 THERAPEUTIC EXERCISES: CPT

## 2019-12-20 RX ORDER — INSULIN GLARGINE 100 [IU]/ML
25 INJECTION, SOLUTION SUBCUTANEOUS
Status: DISCONTINUED | OUTPATIENT
Start: 2019-12-21 | End: 2019-12-21 | Stop reason: HOSPADM

## 2019-12-20 RX ADMIN — IPRATROPIUM BROMIDE AND ALBUTEROL SULFATE 3 ML: 2.5; .5 SOLUTION RESPIRATORY (INHALATION) at 15:08

## 2019-12-20 RX ADMIN — PREDNISONE 20 MG: 20 TABLET ORAL at 08:56

## 2019-12-20 RX ADMIN — SODIUM CHLORIDE, PRESERVATIVE FREE 10 ML: 5 INJECTION INTRAVENOUS at 22:04

## 2019-12-20 RX ADMIN — BUDESONIDE 1 MG: 1 SUSPENSION RESPIRATORY (INHALATION) at 06:55

## 2019-12-20 RX ADMIN — CLONAZEPAM 0.25 MG: 0.5 TABLET ORAL at 22:03

## 2019-12-20 RX ADMIN — ISOSORBIDE MONONITRATE 30 MG: 30 TABLET ORAL at 08:56

## 2019-12-20 RX ADMIN — METOCLOPRAMIDE 10 MG: 10 TABLET ORAL at 07:07

## 2019-12-20 RX ADMIN — ASPIRIN 81 MG: 81 TABLET, CHEWABLE ORAL at 08:57

## 2019-12-20 RX ADMIN — ARFORMOTEROL TARTRATE 15 MCG: 15 SOLUTION RESPIRATORY (INHALATION) at 06:55

## 2019-12-20 RX ADMIN — DOCUSATE SODIUM 100 MG: 100 CAPSULE, LIQUID FILLED ORAL at 22:03

## 2019-12-20 RX ADMIN — INSULIN GLARGINE 30 UNITS: 100 INJECTION, SOLUTION SUBCUTANEOUS at 08:54

## 2019-12-20 RX ADMIN — SODIUM CHLORIDE 4 ML: 7 NEBU SOLN,3 % NEBU at 10:59

## 2019-12-20 RX ADMIN — TRAMADOL HYDROCHLORIDE 50 MG: 50 TABLET, FILM COATED ORAL at 15:45

## 2019-12-20 RX ADMIN — IPRATROPIUM BROMIDE AND ALBUTEROL SULFATE 3 ML: 2.5; .5 SOLUTION RESPIRATORY (INHALATION) at 10:59

## 2019-12-20 RX ADMIN — METOCLOPRAMIDE 10 MG: 10 TABLET ORAL at 11:52

## 2019-12-20 RX ADMIN — ATORVASTATIN CALCIUM 40 MG: 20 TABLET, FILM COATED ORAL at 08:56

## 2019-12-20 RX ADMIN — FUROSEMIDE 40 MG: 40 TABLET ORAL at 08:56

## 2019-12-20 RX ADMIN — AMLODIPINE BESYLATE 10 MG: 10 TABLET ORAL at 08:56

## 2019-12-20 RX ADMIN — CLOPIDOGREL 75 MG: 75 TABLET, FILM COATED ORAL at 08:56

## 2019-12-20 RX ADMIN — HYDROCORTISONE 10 MG: 10 TABLET ORAL at 22:03

## 2019-12-20 RX ADMIN — HYDROCORTISONE 10 MG: 10 TABLET ORAL at 08:56

## 2019-12-20 RX ADMIN — INSULIN LISPRO 8 UNITS: 100 INJECTION, SOLUTION INTRAVENOUS; SUBCUTANEOUS at 17:49

## 2019-12-20 RX ADMIN — PREDNISONE 20 MG: 20 TABLET ORAL at 17:29

## 2019-12-20 RX ADMIN — GUAIFENESIN AND DEXTROMETHORPHAN HYDROBROMIDE 1 TABLET: 600; 30 TABLET, EXTENDED RELEASE ORAL at 22:03

## 2019-12-20 RX ADMIN — AZITHROMYCIN DIHYDRATE 500 MG: 250 TABLET, FILM COATED ORAL at 17:29

## 2019-12-20 RX ADMIN — CALCITONIN SALMON 1 SPRAY: 200 SPRAY, METERED NASAL at 08:57

## 2019-12-20 RX ADMIN — GUAIFENESIN AND DEXTROMETHORPHAN HYDROBROMIDE 1 TABLET: 600; 30 TABLET, EXTENDED RELEASE ORAL at 08:56

## 2019-12-20 RX ADMIN — SODIUM CHLORIDE, PRESERVATIVE FREE 10 ML: 5 INJECTION INTRAVENOUS at 10:42

## 2019-12-20 RX ADMIN — ONDANSETRON 4 MG: 2 INJECTION INTRAMUSCULAR; INTRAVENOUS at 08:53

## 2019-12-20 RX ADMIN — LEVOTHYROXINE SODIUM 88 MCG: 88 TABLET ORAL at 05:55

## 2019-12-20 RX ADMIN — CLONAZEPAM 0.25 MG: 0.5 TABLET ORAL at 08:55

## 2019-12-20 RX ADMIN — ALLOPURINOL 100 MG: 100 TABLET ORAL at 08:56

## 2019-12-20 RX ADMIN — INSULIN LISPRO 5 UNITS: 100 INJECTION, SOLUTION INTRAVENOUS; SUBCUTANEOUS at 22:03

## 2019-12-20 RX ADMIN — METOCLOPRAMIDE 10 MG: 10 TABLET ORAL at 17:29

## 2019-12-20 NOTE — PLAN OF CARE
Problem: Patient Care Overview  Goal: Plan of Care Review  Outcome: Ongoing (interventions implemented as appropriate)  Flowsheets (Taken 12/20/2019 3288)  Plan of Care Reviewed With: patient  Outcome Summary: pt able to complete amb dist w/o seated rest today, but seems slightly confused -will tell 2 diff people 2 diff answers in same few min time; attempted sitting w/o warning and cues to reach back and feel chair -tried sitting before at chair

## 2019-12-20 NOTE — PROGRESS NOTES
"      [facility]         Dr Salvador Sayied   LOS: 7 days   Patient Care Team:  Esha Medley as PCP - General (Nurse Practitioner)    Chief Complaint: Acute exacerbation of COPD with suspected ILD caused by respiratory bronchiolitis chronic respiratory failure tobacco abuse diastolic CHF    Interval History: Continues to feel better with improving respiratory status.  Still gets short of breath with exertion.    REVIEW OF SYSTEMS:   CARDIOVASCULAR: No chest pain, chest pressure or chest discomfort. No palpitations or edema.   RESPIRATORY: Shortness of breath cough congestion and wheezing  GASTROINTESTINAL: No anorexia, nausea, vomiting or diarrhea. No abdominal pain or blood.   HEMATOLOGIC: No bleeding or bruising.     Ventilator/Non-Invasive Ventilation Settings (From admission, onward)    None            Vital Signs  Temp:  [97.4 °F (36.3 °C)-98.1 °F (36.7 °C)] 97.7 °F (36.5 °C)  Heart Rate:  [69-94] 94  Resp:  [18-20] 20  BP: (139-180)/(78-85) 139/78    Intake/Output Summary (Last 24 hours) at 12/20/2019 1656  Last data filed at 12/20/2019 1500  Gross per 24 hour   Intake 480 ml   Output 2050 ml   Net -1570 ml     Flowsheet Rows      First Filed Value   Admission Height  149.9 cm (59\") Documented at 12/13/2019 1208   Admission Weight  73.1 kg (161 lb 1.6 oz) Documented at 12/13/2019 1208          Physical Exam:   General Appearance:    Alert, cooperative, in no acute distress.  No labored breathing   Lungs:    Diminished breath sounds bilateral wheezing    Heart:    Regular rhythm and normal rate, normal S1 and S2, no            murmur, no gallop, no rub, no click   Chest Wall:    No abnormalities observed   Abdomen:     Normal bowel sounds, no masses, no organomegaly, soft        non-tender, non-distended, no guarding, no rebound                tenderness   Extremities:   Moves all extremities well, no edema, no cyanosis, no             redness     Results Review:        Results from last 7 days "   Lab Units 12/20/19  0519 12/19/19  0924 12/18/19  0425   SODIUM mmol/L 140 139 139   POTASSIUM mmol/L 4.3 4.4 4.1   CHLORIDE mmol/L 101 99 100   CO2 mmol/L 29.7* 30.0* 29.4*   BUN mg/dL 27* 28* 35*   CREATININE mg/dL 0.60 0.58 0.71   GLUCOSE mg/dL 66 92 86   CALCIUM mg/dL 8.7 8.7 8.8     Results from last 7 days   Lab Units 12/13/19  2228   TROPONIN T ng/mL <0.010     Results from last 7 days   Lab Units 12/18/19  0425 12/17/19  0638 12/16/19  0424   WBC 10*3/mm3 9.34 3.53 12.58*   HEMOGLOBIN g/dL 10.8* 10.1* 10.1*   HEMATOCRIT % 32.0* 31.7* 30.7*   PLATELETS 10*3/mm3 136* 139* 141                           I reviewed the patient's new clinical results.  I personally viewed and interpreted the patient's CXR        Medication Review:     allopurinol 100 mg Oral Daily   amLODIPine 10 mg Oral Daily   arformoterol 15 mcg Nebulization BID - RT   aspirin 81 mg Oral Daily   atorvastatin 40 mg Oral Daily   azithromycin 500 mg Oral Q24H   budesonide 1 mg Nebulization BID - RT   calcitonin (salmon) 1 spray Alternating Nares Daily   clonazePAM 0.25 mg Oral BID   clopidogrel 75 mg Oral Daily   docusate sodium 100 mg Oral BID   furosemide 40 mg Oral Daily   guaifenesin-dextromethorphan 1 tablet Oral BID   hydrocortisone 10 mg Oral BID   [START ON 12/21/2019] insulin glargine 25 Units Subcutaneous Daily With Breakfast   insulin lispro 0-14 Units Subcutaneous 4x Daily With Meals & Nightly   ipratropium-albuterol 3 mL Nebulization 4x Daily - RT   isosorbide mononitrate 30 mg Oral Daily   levothyroxine 88 mcg Oral Q AM   metoclopramide 10 mg Oral TID AC   predniSONE 20 mg Oral BID With Meals   sodium chloride 10 mL Intravenous Q12H   sodium chloride 4 mL Nebulization BID - RT            ASSESSMENT:   Acute exacerbation of COPD  Questionable pneumonia/interstitial changes/respiratory bronchiolitis  Chronic respiratory failure  Tobacco abuse  Diastolic CHF  Obesity    PLAN:  Still wheezing on my today's exam.  cxray with patchy  infiltrates.increase po zithromax 500 mg for 4 more days.  Continue weaning down steroids as tolerated  Ambulate  Wean down oxygen as tolerated  Complete 5 days of Zithromax p.o.  Advised patient to quit smoking long-term  From the CT it appears more interstitial changes suspected to be respiratory bronchiolitis from chronic smoking.  I would recommend follow-up CT chest in 3 months to see progression  Discussed plan of care in detail with the patient  No objections to discharge with slow tapering steroids    Modesto Montague MD  12/20/19  4:56 PM

## 2019-12-20 NOTE — PROGRESS NOTES
Name: Vanessa Colunga ADMIT: 2019   : 1955  PCP: Esha Medley    MRN: [id number] LOS: 7 days   AGE/SEX: 64 y.o. female  ROOM: [facility]     Subjective   Subjective   CC: dyspnea, wheezing  No more hemoptysis but still with significant  cough and worse SOA today.  No CP/f/c/n/v/d.    Objective   Objective   Vital Signs  Temp:  [97.4 °F (36.3 °C)-98.1 °F (36.7 °C)] 97.7 °F (36.5 °C)  Heart Rate:  [69-94] 94  Resp:  [18-20] 20  BP: (139-180)/(78-85) 139/78  SpO2:  [98 %-100 %] 99 %  on  Flow (L/min):  [2] 2;   Device (Oxygen Therapy): nasal cannula  Body mass index is 31.37 kg/m².  Physical Exam   Constitutional: She is oriented to person, place, and time. No distress.   HENT:   Head: Normocephalic and atraumatic.   Mouth/Throat: Oropharynx is clear and moist.   Eyes: Pupils are equal, round, and reactive to light. Conjunctivae and EOM are normal.   Neck: Normal range of motion. Neck supple.   Cardiovascular: Normal rate, regular rhythm and intact distal pulses.   Pulmonary/Chest: Effort normal. She has wheezes (scattered).   Abdominal: Soft. Bowel sounds are normal. There is no tenderness.   Musculoskeletal: She exhibits no edema or tenderness.   Neurological: She is alert and oriented to person, place, and time.   Skin: Skin is warm and dry. Capillary refill takes less than 2 seconds. She is not diaphoretic.   Psychiatric: She has a normal mood and affect. Her behavior is normal.   Nursing note and vitals reviewed.      Results Review:       I reviewed the patient's new clinical results.  Results from last 7 days   Lab Units 19  0425 19  0638 19  0424 12/15/19  0555   WBC 10*3/mm3 9.34 3.53 12.58* 13.04*   HEMOGLOBIN g/dL 10.8* 10.1* 10.1* 10.3*   PLATELETS 10*3/mm3 136* 139* 141 163     Results from last 7 days   Lab Units 19  0519 19  0924 19  0425 19  0638   SODIUM mmol/L 140 139 139 142   POTASSIUM mmol/L 4.3 4.4 4.1 3.7   CHLORIDE mmol/L 101 99 100 113*    CO2 mmol/L 29.7* 30.0* 29.4* 19.2*   BUN mg/dL 27* 28* 35* 13   CREATININE mg/dL 0.60 0.58 0.71 0.51*   GLUCOSE mg/dL 66 92 86 95   Estimated Creatinine Clearance: 62.7 mL/min (by C-G formula based on SCr of 0.6 mg/dL).      Results from last 7 days   Lab Units 12/20/19  0519 12/19/19  0924 12/18/19  0425 12/17/19  0638   CALCIUM mg/dL 8.7 8.7 8.8 7.4*     Results from last 7 days   Lab Units 12/13/19  1830   LACTATE mmol/L 1.0     Glucose   Date/Time Value Ref Range Status   12/20/2019 1605 266 (H) 70 - 130 mg/dL Final   12/20/2019 1122 131 (H) 70 - 130 mg/dL Final   12/20/2019 0814 143 (H) 70 - 130 mg/dL Final   12/20/2019 0646 68 (L) 70 - 130 mg/dL Final   12/20/2019 0603 54 (L) 70 - 130 mg/dL Final   12/19/2019 2020 254 (H) 70 - 130 mg/dL Final   12/19/2019 1633 204 (H) 70 - 130 mg/dL Final         allopurinol 100 mg Oral Daily   amLODIPine 10 mg Oral Daily   arformoterol 15 mcg Nebulization BID - RT   aspirin 81 mg Oral Daily   atorvastatin 40 mg Oral Daily   azithromycin 500 mg Oral Q24H   budesonide 1 mg Nebulization BID - RT   calcitonin (salmon) 1 spray Alternating Nares Daily   clonazePAM 0.25 mg Oral BID   clopidogrel 75 mg Oral Daily   docusate sodium 100 mg Oral BID   furosemide 40 mg Oral Daily   guaifenesin-dextromethorphan 1 tablet Oral BID   hydrocortisone 10 mg Oral BID   [START ON 12/21/2019] insulin glargine 25 Units Subcutaneous Daily With Breakfast   insulin lispro 0-14 Units Subcutaneous 4x Daily With Meals & Nightly   ipratropium-albuterol 3 mL Nebulization 4x Daily - RT   isosorbide mononitrate 30 mg Oral Daily   levothyroxine 88 mcg Oral Q AM   metoclopramide 10 mg Oral TID AC   predniSONE 20 mg Oral BID With Meals   sodium chloride 10 mL Intravenous Q12H   sodium chloride 4 mL Nebulization BID - RT      Diet Regular; Cardiac, Consistent Carbohydrate       Assessment/Plan     Active Hospital Problems    Diagnosis  POA   • Pneumonia of both upper lobes due to infectious organism (CMS/[facility])  [J18.1]  Yes   • Chronic diastolic CHF (congestive heart failure) (CMS/[facility]) [I50.32]  Yes   • Type 2 diabetes mellitus with circulatory disorder, with long-term current use of insulin (CMS/[facility]) [E11.59, Z79.4]  Not Applicable   • Essential hypertension [I10]  Yes   • Chronic respiratory failure with hypoxia and hypercapnia (CMS/[facility]) [J96.11, J96.12]  Yes   • DNR (do not resuscitate) [Z66]  Yes   • History of ESBL E. 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   COPD Exacerbation  - continue on prednisone and nebulizer treatments, hypertonic saline nebs added  - CXR showing patchy infiltrates-on azithromycin to complete 5 days   - having some hemoptysis probably from bronchitis/PNA (no epistaxis, CTA 12/13 negative for PE), has resolved off lovenox  - she has chronic interstitial changes on CT scan-repeat CT in 3 months  - appreciate pulm recs    Type 2 DM  - complications include gastroparesis-symptoms have improved  - having some hypoglycemia  - continue on lantus 30 units QAM, stop lantus 20 units QPM  - cover with ssi/hypoglycemia protocol but will decrease dose to moderate-high    PAF  - appears to be in sinus rhythm  - not on chronic AC    Chronic Diastolic CHF  - appears euvolemic  - continue oral lasix    Darron's Disease  - continue cortef    SCDs for DVT prophylaxis.  DNR.  Discussed with patient and nursing staff.  Anticipate discharge: hopefully to rehab tomorrow still working a little to hard to go today      Brendan CASILLAS  MD [place]  [facility]  12/20/19  5:31 PM

## 2019-12-20 NOTE — THERAPY TREATMENT NOTE
Acute Care - Physical Therapy Treatment Note  [facility]     Patient Name: Vanessa Colunga  : 1955  MRN: [id number]  Today's Date: 2019  Onset of Illness/Injury or Date of Surgery: 19  Date of Referral to PT: 19  Referring Physician: DONY)    Admit Date: 2019    Visit Dx:    ICD-10-CM ICD-9-CM   1. Pneumonia of both upper lobes due to infectious organism (CMS/[facility]) J18.1 483.8   2. COPD exacerbation (CMS/[facility]) J44.1 491.21   3. Chest pain, unspecified type R07.9 786.50   4. Decreased mobility and endurance Z74.09 780.99     Patient Active Problem List   Diagnosis   • Pneumonia of both upper lobes due to infectious organism (CMS/[facility])   • Chronic diastolic CHF (congestive heart failure) (CMS/[facility])   • Type 2 diabetes mellitus with circulatory disorder, with long-term current use of insulin (CMS/[facility])   • Essential hypertension   • Chronic respiratory failure with hypoxia and hypercapnia (CMS/[facility])   • DNR (do not resuscitate)   • History of ESBL E. coli infection   • [place]'s disease (CMS/[facility])   • Chronic kidney disease, stage III (moderate) (CMS/[facility])   • Hypothyroidism (acquired)   • Gastroparesis diabeticorum (CMS/[facility])   • Paroxysmal atrial fibrillation (CMS/[facility])   • Coronary artery disease involving native coronary artery without angina pectoris   • COPD with acute exacerbation (CMS/[facility])       Therapy Treatment    Rehabilitation Treatment Summary     Row Name 19             Treatment Time/Intention    Discipline  physical therapy assistant  -LAURENT      Document Type  therapy note (daily note)  -LAURENT      Subjective Information  complains of;weakness;fatigue  -LAURENT      Mode of Treatment  individual therapy;physical therapy  -      Care Plan Review  patient/other agree to care plan  -LAURENT      Existing Precautions/Restrictions  fall;oxygen therapy device and L/min 2L  -LAURENT      Recorded by [LAURENT] Cris Shelley PTA 19 1644      Row Name 19             Vital Signs     Pretreatment Heart Rate (beats/min)  87  -      Posttreatment Heart Rate (beats/min)  89  -JM      Pre SpO2 (%)  95  -JM      O2 Delivery Pre Treatment  supplemental O2  -      Post SpO2 (%)  94  -      O2 Delivery Post Treatment  supplemental O2  -      Recorded by [JM] Cris Shelley, [facility] 12/20/19 0949      Row Name 12/20/19 0900             Bed Mobility Assessment/Treatment    Scooting/Bridging [place] (Bed Mobility)  minimum assist (75% patient effort)  -      Assistive Device (Bed Mobility)  draw sheet;bed rails  -      Recorded by [JM] Cris Shelley, [facility] 12/20/19 0949      Row Name 12/20/19 0900             Sit-Stand Transfer    Sit-Stand [place] (Transfers)  2 person assist;contact guard;verbal cues  -      Assistive Device (Sit-Stand Transfers)  walker, front-wheeled  -      Recorded by [] Cris Shelley, [facility] 12/20/19 0949      Row Name 12/20/19 0900             Stand-Sit Transfer    Stand-Sit [place] (Transfers)  contact guard;verbal cues;nonverbal cues (demo/gesture)  -      Assistive Device (Stand-Sit Transfers)  walker, front-wheeled  -      Recorded by [JM] Cris Shelley, [facility] 12/20/19 0949      Row Name 12/20/19 0900             Gait/Stairs Assessment/Training    [place] Level (Gait)  2 person assist;contact guard;verbal cues  -      Assistive Device (Gait)  walker, front-wheeled  -      Distance in Feet (Gait)  50  -JM      Deviations/Abnormal Patterns (Gait)  stride length decreased  -      Bilateral Gait Deviations  forward flexed posture;weight shift ability decreased  -      Comment (Gait/Stairs)  did not req seated rest w/encouragement required; did attempt to try sitting once during amb w/o warning-assist of 2 for safety  -      Recorded by [] Cris Shelley, PTA 12/20/19 0949      Row Name 12/20/19 0900             Positioning and Restraints    Pre-Treatment Position  in bed  -      In Chair  reclined;call light within  reach;encouraged to call for assist;exit alarm on;notified nsg  -JM      Recorded by [] Cris Shelley PTA 12/20/19 0949      Row Name 12/20/19 0900             Pain Scale: Numbers Pre/Post-Treatment    Pain Scale: Numbers, Pretreatment  0/10 - no pain  -LAURENT      Pain Scale: Numbers, Post-Treatment  0/10 - no pain  -LAURENT      Recorded by [LAURENT] Cris Shelley PTA 12/20/19 0949        User Key  (r) = Recorded By, (t) = Taken By, (c) = Cosigned By    Initials Name Effective Dates Discipline    Cris Jhaveri PTA 03/07/18 -  PT                       PT Recommendation and Plan     Plan of Care Reviewed With: patient  Progress: improving  Outcome Summary: pt able to complete amb dist w/o seated rest today, but seems slightly confused -will tell 2 diff people 2 diff answers in same few min time; attempted sitting w/o warning and cues to reach back and feel chair -tried sitting before at chair  Outcome Measures     Row Name 12/20/19 0900 12/17/19 1800          How much help from another person do you currently need...    Turning from your back to your side while in flat bed without using bedrails?  3  -JM  3  -JM     Moving from lying on back to sitting on the side of a flat bed without bedrails?  3  -JM  2  -JM     Moving to and from a bed to a chair (including a wheelchair)?  3  -JM  3  -JM     Standing up from a chair using your arms (e.g., wheelchair, bedside chair)?  3  -JM  3  -JM     Climbing 3-5 steps with a railing?  2  -JM  1  -JM     To walk in hospital room?  3  -JM  3  -JM     AM-PAC 6 Clicks Score (PT)  17  -JM  15  -JM       User Key  (r) = Recorded By, (t) = Taken By, (c) = Cosigned By    Initials Name Provider Type    Cris Jhaveri PTA Physical Therapy Assistant         Time Calculation:   PT Charges     Row Name 12/20/19 0952             Time Calculation    Start Time  0840  -      Stop Time  0900  -      Time Calculation (min)  20 min  -LAURENT      PT Received On  12/20/19  -      PT -  Next Appointment  12/21/19  -LAURENT        User Key  (r) = Recorded By, (t) = Taken By, (c) = Cosigned By    Initials Name Provider Type    Cris Jhaveri PTA Physical Therapy Assistant        Therapy Charges for Today     Code Description Service Date Service Provider Modifiers Qty    80952552828 HC PT THER PROC EA 15 MIN 12/19/2019 Cris Shelley, [place] GP 2    81318350003 HC PT THER SUPP EA 15 MIN 12/19/2019 Cris Shelley, [place] GP 2    67960803422 HC PT THER PROC EA 15 MIN 12/20/2019 Cris Shelley, [place] GP 1    10884505897 HC PT THER SUPP EA 15 MIN 12/20/2019 Cris Shelley, [place] GP 1          PT G-Codes  Outcome Measure Options: AM-PAC 6 Clicks Basic Mobility (PT)  AM-PAC 6 Clicks Score (PT): 17    Cris Shelley PTA  12/20/2019

## 2019-12-20 NOTE — PLAN OF CARE
Problem: Patient Care Overview  Goal: Plan of Care Review  Outcome: Ongoing (interventions implemented as appropriate)   Sleeping soundly with no problems, b/p slightly elevated, called to [facility], no new orders received, pt with no other symptoms or c/o's. Encourage to turn self side to side more frequent, pt able to perform independently, cont daily wts, stable last 3 days. Medicated for c/o L arm/shoulder pain, med. effective. NAD, cont to monitor.

## 2019-12-20 NOTE — PLAN OF CARE
Problem: Patient Care Overview  Goal: Plan of Care Review  Outcome: Ongoing (interventions implemented as appropriate)  Flowsheets  Taken 12/18/2019 1644  Progress: improving  Taken 12/20/2019 1833  Plan of Care Reviewed With: patient  Outcome Summary: Pt's last blood sugar was 266 today.  Continues with wheezing in lungs, continues with therapy with PT.  Sat up in chair earlier today. Vomiting x 1 earlier today and PRN Zofran given and was effective.  PRN Tramadol given and was effective. No s/s of distress noted.

## 2019-12-20 NOTE — PROGRESS NOTES
[facility]ued Stay [facility]     Patient Name: Vanessa Colunga  MRN: [id number]  Today's Date: 12/20/2019    Admit Date: 12/13/2019    Discharge Plan     Row Name 12/20/19 1321       Plan    Plan  [facility]    Patient/Family in Agreement with Plan  yes    Plan Comments  Message left with Cr Arreguin (nephew). Patient is able to DC to [facility] when medically stable. Adjustments noted to insulin today. Possible DC over the weekend. Partial packet left at nurses station. Ana Maria Rice RN        Discharge Codes    No documentation.       Expected Discharge Date and Time     Expected Discharge Date Expected Discharge Time    Dec 20, 2019             Ana Maria Rice RN

## 2019-12-21 VITALS
SYSTOLIC BLOOD PRESSURE: 143 MMHG | HEIGHT: 59 IN | DIASTOLIC BLOOD PRESSURE: 69 MMHG | OXYGEN SATURATION: 98 % | TEMPERATURE: 97.5 F | WEIGHT: 161 LBS | HEART RATE: 88 BPM | RESPIRATION RATE: 18 BRPM | BODY MASS INDEX: 32.46 KG/M2

## 2019-12-21 LAB
ANION GAP SERPL CALCULATED.3IONS-SCNC: 8.8 MMOL/L (ref 5–15)
BUN BLD-MCNC: 28 MG/DL (ref 8–23)
BUN/CREAT SERPL: 43.8 (ref 7–25)
CALCIUM SPEC-SCNC: 8.7 MG/DL (ref 8.6–10.5)
CHLORIDE SERPL-SCNC: 99 MMOL/L (ref 98–107)
CO2 SERPL-SCNC: 31.2 MMOL/L (ref 22–29)
CREAT BLD-MCNC: 0.64 MG/DL (ref 0.57–1)
DEPRECATED RDW RBC AUTO: 47.1 FL (ref 37–54)
ERYTHROCYTE [DISTWIDTH] IN BLOOD BY AUTOMATED COUNT: 14.6 % (ref 12.3–15.4)
GFR SERPL CREATININE-BSD FRML MDRD: 93 ML/MIN/1.73
GLUCOSE BLD-MCNC: 109 MG/DL (ref 65–99)
GLUCOSE BLDC GLUCOMTR-MCNC: 109 MG/DL (ref 70–130)
GLUCOSE BLDC GLUCOMTR-MCNC: 337 MG/DL (ref 70–130)
GLUCOSE BLDC GLUCOMTR-MCNC: 98 MG/DL (ref 70–130)
HCT VFR BLD AUTO: 32 % (ref 34–46.6)
HGB BLD-MCNC: 10.6 G/DL (ref 12–15.9)
MAGNESIUM SERPL-MCNC: 2.1 MG/DL (ref 1.6–2.4)
MCH RBC QN AUTO: 29.7 PG (ref 26.6–33)
MCHC RBC AUTO-ENTMCNC: 33.1 G/DL (ref 31.5–35.7)
MCV RBC AUTO: 89.6 FL (ref 79–97)
PLATELET # BLD AUTO: 156 10*3/MM3 (ref 140–450)
PMV BLD AUTO: 11.2 FL (ref 6–12)
POTASSIUM BLD-SCNC: 4.6 MMOL/L (ref 3.5–5.2)
RBC # BLD AUTO: 3.57 10*6/MM3 (ref 3.77–5.28)
SODIUM BLD-SCNC: 139 MMOL/L (ref 136–145)
WBC NRBC COR # BLD: 9.39 10*3/MM3 (ref 3.4–10.8)

## 2019-12-21 PROCEDURE — 85027 COMPLETE CBC AUTOMATED: CPT | Performed by: HOSPITALIST

## 2019-12-21 PROCEDURE — 80048 BASIC METABOLIC PNL TOTAL CA: CPT | Performed by: INTERNAL MEDICINE

## 2019-12-21 PROCEDURE — 82962 GLUCOSE BLOOD TEST: CPT

## 2019-12-21 PROCEDURE — 83735 ASSAY OF MAGNESIUM: CPT | Performed by: HOSPITALIST

## 2019-12-21 PROCEDURE — 63710000001 INSULIN GLARGINE PER 5 UNITS: Performed by: HOSPITALIST

## 2019-12-21 PROCEDURE — 63710000001 PREDNISONE PER 1 MG: Performed by: INTERNAL MEDICINE

## 2019-12-21 PROCEDURE — 94799 UNLISTED PULMONARY SVC/PX: CPT

## 2019-12-21 PROCEDURE — 63710000001 INSULIN LISPRO (HUMAN) PER 5 UNITS: Performed by: INTERNAL MEDICINE

## 2019-12-21 RX ORDER — BUDESONIDE 1 MG/2ML
1 INHALANT ORAL
Start: 2019-12-21

## 2019-12-21 RX ORDER — AZITHROMYCIN 250 MG/1
TABLET, FILM COATED ORAL
Start: 2019-12-21

## 2019-12-21 RX ORDER — ARFORMOTEROL TARTRATE 15 UG/2ML
15 SOLUTION RESPIRATORY (INHALATION)
Qty: 120 ML
Start: 2019-12-21

## 2019-12-21 RX ORDER — BENZONATATE 100 MG/1
100 CAPSULE ORAL 3 TIMES DAILY PRN
Start: 2019-12-21

## 2019-12-21 RX ORDER — GUAIFENESIN AND DEXTROMETHORPHAN HYDROBROMIDE 600; 30 MG/1; MG/1
1 TABLET, EXTENDED RELEASE ORAL 2 TIMES DAILY
Start: 2019-12-21

## 2019-12-21 RX ORDER — FUROSEMIDE 40 MG/1
40 TABLET ORAL DAILY
Start: 2019-12-22

## 2019-12-21 RX ORDER — ALBUTEROL SULFATE 2.5 MG/3ML
2.5 SOLUTION RESPIRATORY (INHALATION) EVERY 6 HOURS PRN
Refills: 12
Start: 2019-12-21

## 2019-12-21 RX ORDER — SODIUM CHLORIDE FOR INHALATION 7 %
4 VIAL, NEBULIZER (ML) INHALATION
Start: 2019-12-21

## 2019-12-21 RX ORDER — PREDNISONE 1 MG/1
TABLET ORAL
Qty: 21 TABLET | Refills: 0
Start: 2019-12-21

## 2019-12-21 RX ORDER — IPRATROPIUM BROMIDE AND ALBUTEROL SULFATE 2.5; .5 MG/3ML; MG/3ML
3 SOLUTION RESPIRATORY (INHALATION)
Qty: 360 ML
Start: 2019-12-21

## 2019-12-21 RX ADMIN — IPRATROPIUM BROMIDE AND ALBUTEROL SULFATE 3 ML: 2.5; .5 SOLUTION RESPIRATORY (INHALATION) at 14:46

## 2019-12-21 RX ADMIN — INSULIN LISPRO 10 UNITS: 100 INJECTION, SOLUTION INTRAVENOUS; SUBCUTANEOUS at 17:30

## 2019-12-21 RX ADMIN — PREDNISONE 20 MG: 20 TABLET ORAL at 09:35

## 2019-12-21 RX ADMIN — METOCLOPRAMIDE 10 MG: 10 TABLET ORAL at 17:30

## 2019-12-21 RX ADMIN — SODIUM CHLORIDE 4 ML: 7 NEBU SOLN,3 % NEBU at 07:01

## 2019-12-21 RX ADMIN — HYDROCORTISONE 10 MG: 10 TABLET ORAL at 09:35

## 2019-12-21 RX ADMIN — ARFORMOTEROL TARTRATE 15 MCG: 15 SOLUTION RESPIRATORY (INHALATION) at 10:39

## 2019-12-21 RX ADMIN — ALLOPURINOL 100 MG: 100 TABLET ORAL at 09:35

## 2019-12-21 RX ADMIN — AMLODIPINE BESYLATE 10 MG: 10 TABLET ORAL at 09:35

## 2019-12-21 RX ADMIN — ATORVASTATIN CALCIUM 40 MG: 20 TABLET, FILM COATED ORAL at 09:35

## 2019-12-21 RX ADMIN — ISOSORBIDE MONONITRATE 30 MG: 30 TABLET ORAL at 09:35

## 2019-12-21 RX ADMIN — AZITHROMYCIN DIHYDRATE 500 MG: 250 TABLET, FILM COATED ORAL at 17:30

## 2019-12-21 RX ADMIN — CLOPIDOGREL 75 MG: 75 TABLET, FILM COATED ORAL at 09:35

## 2019-12-21 RX ADMIN — SODIUM CHLORIDE, PRESERVATIVE FREE 10 ML: 5 INJECTION INTRAVENOUS at 09:35

## 2019-12-21 RX ADMIN — METOCLOPRAMIDE 10 MG: 10 TABLET ORAL at 12:06

## 2019-12-21 RX ADMIN — METOCLOPRAMIDE 10 MG: 10 TABLET ORAL at 07:10

## 2019-12-21 RX ADMIN — FUROSEMIDE 40 MG: 40 TABLET ORAL at 09:35

## 2019-12-21 RX ADMIN — ASPIRIN 81 MG: 81 TABLET, CHEWABLE ORAL at 09:35

## 2019-12-21 RX ADMIN — INSULIN GLARGINE 25 UNITS: 100 INJECTION, SOLUTION SUBCUTANEOUS at 09:34

## 2019-12-21 RX ADMIN — IPRATROPIUM BROMIDE AND ALBUTEROL SULFATE 3 ML: 2.5; .5 SOLUTION RESPIRATORY (INHALATION) at 06:53

## 2019-12-21 RX ADMIN — TRAMADOL HYDROCHLORIDE 50 MG: 50 TABLET, FILM COATED ORAL at 17:30

## 2019-12-21 RX ADMIN — GUAIFENESIN AND DEXTROMETHORPHAN HYDROBROMIDE 1 TABLET: 600; 30 TABLET, EXTENDED RELEASE ORAL at 09:35

## 2019-12-21 RX ADMIN — CALCITONIN SALMON 1 SPRAY: 200 SPRAY, METERED NASAL at 09:38

## 2019-12-21 RX ADMIN — TRAMADOL HYDROCHLORIDE 50 MG: 50 TABLET, FILM COATED ORAL at 09:35

## 2019-12-21 RX ADMIN — LEVOTHYROXINE SODIUM 88 MCG: 88 TABLET ORAL at 07:09

## 2019-12-21 RX ADMIN — PREDNISONE 20 MG: 20 TABLET ORAL at 17:30

## 2019-12-21 RX ADMIN — CLONAZEPAM 0.25 MG: 0.5 TABLET ORAL at 09:34

## 2019-12-21 RX ADMIN — BUDESONIDE 1 MG: 1 SUSPENSION RESPIRATORY (INHALATION) at 10:39

## 2019-12-21 NOTE — DISCHARGE SUMMARY
Patient Name: Vanessa Colunga  : 1955  MRN: [id number]    Date of Admission: 2019  Date of Discharge:  2019  Primary Care Physician: Esha Medley      Chief Complaint:   Chest Pain      Discharge Diagnoses     Active Hospital Problems    Diagnosis  POA   • Pneumonia of both upper lobes due to infectious organism (CMS/[facility]) [J18.1]  Yes   • Chronic diastolic CHF (congestive heart failure) (CMS/[facility]) [I50.32]  Yes   • Type 2 diabetes mellitus with circulatory disorder, with long-term current use of insulin (CMS/[facility]) [E11.59, Z79.4]  Not Applicable   • Essential hypertension [I10]  Yes   • Chronic respiratory failure with hypoxia and hypercapnia (CMS/[facility]) [J96.11, J96.12]  Yes   • DNR (do not resuscitate) [Z66]  Yes   • History of ESBL E. coli infection [Z86.19]  Yes   • [place]'s disease (CMS/[facility]) [E27.1]  Yes   • Chronic kidney disease, stage III (moderate) (CMS/[facility]) [N18.3]  Yes   • Hypothyroidism (acquired) [E03.9]  Yes   • Gastroparesis diabeticorum (CMS/[facility]) [E11.43, K31.84]  Yes   • Paroxysmal atrial fibrillation (CMS/[facility]) [I48.0]  Yes   • Coronary artery disease involving native coronary artery without angina pectoris [I25.10]  Yes   • COPD with acute exacerbation (CMS/[facility]) [J44.1]  Yes      Resolved Hospital Problems   No resolved problems to display.        Hospital Course     Ms. Colunga is a 64 y.o. female former smoker with a history of chronic diastolic heart failure, type 2 diabetes, chronic respiratory failure with hypoxia and hypercapnia, chronic kidney disease stage III, paroxysmal A. fib, and COPD who presented to [facility] initially complaining of cough shortness of breath and chest pain.  Please see the admitting history and physical for further details.  She was found to have an acute exacerbation of COPD with lower respiratory infection. and was admitted to the hospital for further evaluation and treatment.  She was admitted with acute exacerbation of COPD and  underlying community-acquired pneumonia.  She had acute hypoxic respiratory failure on top of her chronic respiratory failure present on admission.  There is also some concern for possible aspiration component to this with her intermittent gastroparesis nausea and reflux.  She was started on steroids and appropriate antibiotics.  Her respiratory viral panel was negative.  This ruled out atypical organisms and she was continued on Rocephin here in the hospital.  She completed a course of antibiotic therapy but continued to have issues with shortness of breath and productive cough.  At that point she was placed on azithromycin for its anti-inflammatory effects and has been tolerating that well.  She still has a lot of coarse breath sounds and still has a lot of wheezing but her oxygenation is stabilized and much of her issues are continuing to be acute on chronic.  The plan at this point is for her to discharge to a skilled nursing facility where she can continue hypertonic saline nebulizers twice a day with continued bronchodilators.  She has [place]'s disease and is on chronic daily steroids and will taper slowly off of the steroids that we have now to her home dose.  I discussed the plan with the patient at the bedside and she agrees that she is probably stable enough to go to the nursing home today and continue current treatment options with hopes of regaining her independence and continued respiratory improvement.      Day of Discharge     Not sleeping well in the hospital.  But otherwise feeling a little better.  Still with cough and shortness of breath but this is stabilized for now.    Physical Exam:  Temp:  [97.7 °F (36.5 °C)-98.5 °F (36.9 °C)] 98.3 °F (36.8 °C)  Heart Rate:  [70-94] 77  Resp:  [18-20] 18  BP: (139-163)/(78-85) 157/85  Body mass index is 32.52 kg/m².  Physical Exam   Constitutional: She is oriented to person, place, and time. She appears well-developed and well-nourished.   HENT:   Head:  Normocephalic and atraumatic.   Cardiovascular: Normal rate, regular rhythm and normal heart sounds.   Pulmonary/Chest: Effort normal. No respiratory distress. She has wheezes. She has rales.   Abdominal: Soft. Bowel sounds are normal.   Neurological: She is alert and oriented to person, place, and time.   Skin: Skin is warm and dry. Capillary refill takes less than 2 seconds.   Psychiatric: She has a normal mood and affect. Her behavior is normal.   Nursing note and vitals reviewed.      Consultants     Consult Orders (all) (From admission, onward)     Start     Ordered    12/16/19 1414  Inpatient Pulmonology Consult  Once     Specialty:  Pulmonary Disease  Provider:  Gino Hensley MD    12/16/19 1413    12/15/19 1501  Inpatient Hospice / [facility] Consult  Once     Specialty:  Hospice and Palliative Medicine  Provider:  (Not yet assigned)    12/15/19 1501    12/13/19 2102  Inpatient Case Management  Consult  Once     Provider:  (Not yet assigned)    12/13/19 2105    12/13/19 1756  [facility] (on-call MD unless specified) Details  Once     Specialty:  Hospitalist  Provider:  (Not yet assigned)    12/13/19 1755              Procedures     * Surgery not found *      Imaging Results (All)     Procedure Component Value Units Date/Time    XR Chest 1 View [[id number]] Collected:  12/17/19 1411     Updated:  12/17/19 1416    Narrative:       XR CHEST 1 VW-     HISTORY: Female who is 64 years-old,  short of breath     TECHNIQUE: Frontal view of the chest     COMPARISON:  image from CT from 12/13/2019     FINDINGS: Heart size is normal. Reversed calcified. Pulmonary  vasculature is unremarkable. Patchy density in the mid and lower lungs,  left more than right, may be areas of developing infiltrate, follow-up  suggested. No pleural effusion, or pneumothorax. No acute osseous  process.       Impression:       Patchy densities in both lungs, left more than right,  follow-up suggested.     This report was  finalized on 12/17/2019 2:13 PM by Dr. Erwin Lai M.D.       CT Angiogram Chest [[id number]] Collected:  12/13/19 1751     Updated:  12/13/19 1759    Narrative:       CT ARTERIOGRAM CHEST     HISTORY: Cough and chest pain. Evaluate for pulmonary embolism.     TECHNIQUE: Spiral CT arteriogram of the chest with multiplanar and  three-dimensional reformatted images, produced at a separate  workstation, is provided. This is correlated with a venous Doppler  performed today and reported separately. 95 mL Isovue-370 contrast was  used.     Radiation dose reduction techniques were utilized, including automated  exposure control and exposure modulation based on body size.     FINDINGS: The thoracic aorta is normal in caliber and enhances normally.  The central pulmonary arteries are normal in caliber and enhance  normally as well. There is motion artifact obscuring peripheral lower  lobe arterial structures, but those that are not distorted by motion  enhance as expected. There is no thoracic lymphadenopathy. The  visualized upper abdominal solid organs have a normal appearance. There  is extensive atheromatous vascular calcification and there are clips  from previous cholecystectomy.     There is some septal thickening and ground glass density in the upper  lobes bilaterally, right more than left. On the left anteriorly there  also is some bronchiolectasis. It is unclear if these are acute or  chronic findings. The lungs otherwise appear clear. The costophrenic  sulci are dry. There is advanced osteoarthritic change at the right  glenohumeral joint. No body wall or bone lesion is identified.       Impression:       There are interstitial changes in the upper lobes, slightly  more extensive on the right than the left. These are of unknown  chronicity and could represent chronic lung disease or some element of  acute pneumonia. There is no evidence of pulmonary thromboembolism.  Advanced arthritic change is observed  at the right shoulder.     I discussed the case with Dr. Liz at 5:35 PM.     This report was finalized on 12/13/2019 5:56 PM by Dr. Anam Katz M.D.           Results for orders placed during the hospital encounter of 12/13/19   Duplex Venous Lower Extremity - Right    Narrative · Normal right lower extremity venous duplex scan.           Pertinent Labs     Results from last 7 days   Lab Units 12/21/19 0457 12/18/19 0425 12/17/19  0638 12/16/19  0424   WBC 10*3/mm3 9.39 9.34 3.53 12.58*   HEMOGLOBIN g/dL 10.6* 10.8* 10.1* 10.1*   PLATELETS 10*3/mm3 156 136* 139* 141     Results from last 7 days   Lab Units 12/21/19 0457 12/20/19 0519 12/19/19  0924 12/18/19  0425   SODIUM mmol/L 139 140 139 139   POTASSIUM mmol/L 4.6 4.3 4.4 4.1   CHLORIDE mmol/L 99 101 99 100   CO2 mmol/L 31.2* 29.7* 30.0* 29.4*   BUN mg/dL 28* 27* 28* 35*   CREATININE mg/dL 0.64 0.60 0.58 0.71   GLUCOSE mg/dL 109* 66 92 86   Estimated Creatinine Clearance: 63.9 mL/min (by C-G formula based on SCr of 0.64 mg/dL).    Results from last 7 days   Lab Units 12/21/19 0457 12/20/19 0519 12/19/19  0924 12/18/19  0425   CALCIUM mg/dL 8.7 8.7 8.7 8.8   MAGNESIUM mg/dL 2.1  --   --   --                Invalid input(s): LDLCALC        Test Results Pending at Discharge       Discharge Details        Discharge Medications      New Medications      Instructions Start Date   albuterol (2.5 MG/3ML) 0.083% nebulizer solution  Commonly known as:  PROVENTIL   2.5 mg, Nebulization, Every 6 Hours PRN      arformoterol 15 MCG/2ML nebulizer solution  Commonly known as:  BROVANA   15 mcg, Nebulization, 2 Times Daily - RT      azithromycin 250 MG tablet  Commonly known as:  ZITHROMAX   Take 2 tablets the first day, then 1 tablet daily for 4 days.      benzonatate 100 MG capsule  Commonly known as:  TESSALON   100 mg, Oral, 3 Times Daily PRN      budesonide 1 MG/2ML nebulizer solution  Commonly known as:  PULMICORT   1 mg, Nebulization, 2 Times Daily - RT       furosemide 40 MG tablet  Commonly known as:  LASIX   40 mg, Oral, Daily   Start Date:  December 22, 2019     guaifenesin-dextromethorphan  MG tablet sustained-release 12 hour tablet   1 tablet, Oral, 2 Times Daily      predniSONE 5 MG tablet  Commonly known as:  DELTASONE   Take 6 tablets starting tomorrow then 5 tablets then 4 tablets then 3 tablets then 2 tablets then 1 tablet then stop      sodium chloride 7 % nebulizer solution nebulizer solution   4 mL, Nebulization, 2 Times Daily - RT         Changes to Medications      Instructions Start Date   Insulin Glargine 100 UNIT/ML injection pen  Commonly known as:  LANTUS SOLOSTAR  What changed:    · how much to take  · Another medication with the same name was removed. Continue taking this medication, and follow the directions you see here.   25 Units, Subcutaneous, Daily With Breakfast      ipratropium-albuterol 0.5-2.5 mg/3 ml nebulizer  Commonly known as:  DUO-NEB  What changed:    · when to take this  · reasons to take this   3 mL, Nebulization, 4 Times Daily - RT         Continue These Medications      Instructions Start Date   allopurinol 100 MG tablet  Commonly known as:  ZYLOPRIM   100 mg, Oral, Daily      amLODIPine 10 MG tablet  Commonly known as:  NORVASC   10 mg, Oral, Daily      aspirin 81 MG chewable tablet   81 mg, Oral, Daily      atorvastatin 40 MG tablet  Commonly known as:  LIPITOR   40 mg, Oral, Daily      clonazePAM 0.5 MG tablet  Commonly known as:  KlonoPIN   0.25 mg, Oral, 2 Times Daily      clopidogrel 75 MG tablet  Commonly known as:  PLAVIX   75 mg, Oral, Daily      docusate sodium 100 MG capsule  Commonly known as:  COLACE   100 mg, Oral, 2 Times Daily      HUMALOG 100 UNIT/ML injection  Generic drug:  insulin lispro   Subcutaneous, 3 Times Daily Before Meals, Sliding scale dosage. 140-169 (2 units), 170-199 (4 units), 200-249 (6 units),250-299 (8 units),300-349 (10 units),350-400 (11 units)      HYDROcodone-acetaminophen 7.5-325  MG per tablet  Commonly known as:  NORCO   1 tablet, Oral, Every 6 Hours PRN, Pt states max daily amount of 4 tablets - takes it daily.      hydrocortisone 10 MG tablet  Commonly known as:  CORTEF   10 mg, Oral, 2 Times Daily      isosorbide mononitrate 30 MG 24 hr tablet  Commonly known as:  IMDUR   30 mg, Oral, Daily      levothyroxine 88 MCG tablet  Commonly known as:  SYNTHROID, LEVOTHROID   88 mcg, Oral, Every Morning Before Breakfast      metoclopramide 5 MG tablet  Commonly known as:  REGLAN   5 mg, Oral, 3 Times Daily Before Meals      vitamin D 1.25 MG (59174 UT) capsule capsule  Commonly known as:  ERGOCALCIFEROL   50,000 Units, Oral, Weekly, Pt takes on Tuesdays.         Stop These Medications    budesonide-formoterol 160-4.5 MCG/ACT inhaler  Commonly known as:  SYMBICORT     calcitonin (salmon) 200 UNIT/ACT nasal spray  Commonly known as:  MIACALCIN     traMADol 50 MG tablet  Commonly known as:  ULTRAM            No Known Allergies      Discharge Disposition:  Skilled Nursing Facility (DC - External)    Discharge Diet:  Diet Order   Procedures   • Diet Regular; Cardiac, Consistent Carbohydrate       Discharge Activity:   Activity Instructions     Activity as Tolerated            CODE STATUS:    Code Status and Medical Interventions:   Ordered at: 12/13/19 1957     Limited Support to NOT Include:    Intubation    Cardioversion/Defibrillation     Level Of Support Discussed With:    Patient     Code Status:    No CPR     Medical Interventions (Level of Support Prior to Arrest):    Limited       No future appointments.  Additional Instructions for the Follow-ups that You Need to Schedule     Discharge Follow-up with PCP   As directed       Currently Documented PCP:    Esha Medley    PCP Phone Number:    [phone]     Follow Up Details:  4-6 weeks           Follow-up Information     Esha Medley    Specialty:  Nurse Practitioner  Why:  4-6 weeks  Contact information:  [address] [place] [address]  [address] [place] [id number]  [phone]                   Additional Instructions for the Follow-ups that You Need to Schedule     Discharge Follow-up with PCP   As directed       Currently Documented PCP:    Esha Medley    PCP Phone Number:    [phone]     Follow Up Details:  4-6 weeks           Time Spent on Discharge:  Greater than 30 minutes      Brendan Fitzgerald MD  [facility]  12/21/19  10:59 AM

## 2019-12-21 NOTE — SIGNIFICANT NOTE
12/21/19 1008   Rehab Treatment   Discipline physical therapist   Reason Treatment Not Performed patient/family declined treatment  (Patient declined. Requested tomorrow.)   Recommendation   PT - Next Appointment 12/22/19

## 2019-12-21 NOTE — NURSING NOTE
Spoke with patient about oxygen needs while nephew transports patient to [place]. Patient is A&O X4. Asked patient if she has a portable tank to use during transport. Patient stated that nephew who will be driving patient did not have the tank. Explained to patient that [facility] can provide a loaner tank to use and that tank will need to be returned. Patient stated that she did not need to use O2 while in the car. Explained to patient risks of not wearing O2 after D/C to [place] such as O2 sats decreasing. Patient stated that she would be find. Rommel placed to nephew. Explained the same things to him and informed that a loaner O2 tank could be provided as nephew stated that he did not have a portable tank. Nephew stated that patient did not need O2 while patient is transported to [place] and would be fine. Explained same risk to nephew as did to patient. Nephew stated that they did not need it. O2 removed from patient's nose for 5 mins with sats saying between 94-98%. Dr. Fitzgerald and Dr. Montague made aware.  Rachna Fuller RN

## 2019-12-21 NOTE — PROGRESS NOTES
"      [facility]         Dr Salvador Sayied   LOS: 8 days   Patient Care Team:  Esha Medley as PCP - General (Nurse Practitioner)    Chief Complaint: Acute exacerbation of COPD with suspected ILD caused by respiratory bronchiolitis chronic respiratory failure tobacco abuse diastolic CHF    Interval History: Continues to feel better with improving respiratory status.  Still gets short of breath with exertion.    REVIEW OF SYSTEMS:   CARDIOVASCULAR: No chest pain, chest pressure or chest discomfort. No palpitations or edema.   RESPIRATORY: Shortness of breath cough congestion and wheezing  GASTROINTESTINAL: No anorexia, nausea, vomiting or diarrhea. No abdominal pain or blood.   HEMATOLOGIC: No bleeding or bruising.     Ventilator/Non-Invasive Ventilation Settings (From admission, onward)    None            Vital Signs  Temp:  [97.5 °F (36.4 °C)-98.5 °F (36.9 °C)] 97.5 °F (36.4 °C)  Heart Rate:  [70-88] 88  Resp:  [18-20] 18  BP: (143-163)/(69-85) 143/69    Intake/Output Summary (Last 24 hours) at 12/21/2019 1610  Last data filed at 12/21/2019 1230  Gross per 24 hour   Intake 670 ml   Output 1850 ml   Net -1180 ml     Flowsheet Rows      First Filed Value   Admission Height  149.9 cm (59\") Documented at 12/13/2019 1208   Admission Weight  73.1 kg (161 lb 1.6 oz) Documented at 12/13/2019 1208          Physical Exam:   General Appearance:    Alert, cooperative, in no acute distress.  No labored breathing   Lungs:    Diminished breath sounds bilateral wheezing    Heart:    Regular rhythm and normal rate, normal S1 and S2, no            murmur, no gallop, no rub, no click   Chest Wall:    No abnormalities observed   Abdomen:     Normal bowel sounds, no masses, no organomegaly, soft        non-tender, non-distended, no guarding, no rebound                tenderness   Extremities:   Moves all extremities well, no edema, no cyanosis, no             redness     Results Review:        Results from last 7 days "   Lab Units 12/21/19  0457 12/20/19  0519 12/19/19  0924   SODIUM mmol/L 139 140 139   POTASSIUM mmol/L 4.6 4.3 4.4   CHLORIDE mmol/L 99 101 99   CO2 mmol/L 31.2* 29.7* 30.0*   BUN mg/dL 28* 27* 28*   CREATININE mg/dL 0.64 0.60 0.58   GLUCOSE mg/dL 109* 66 92   CALCIUM mg/dL 8.7 8.7 8.7         Results from last 7 days   Lab Units 12/21/19  0457 12/18/19  0425 12/17/19  0638   WBC 10*3/mm3 9.39 9.34 3.53   HEMOGLOBIN g/dL 10.6* 10.8* 10.1*   HEMATOCRIT % 32.0* 32.0* 31.7*   PLATELETS 10*3/mm3 156 136* 139*             Results from last 7 days   Lab Units 12/21/19  0457   MAGNESIUM mg/dL 2.1               I reviewed the patient's new clinical results.  I personally viewed and interpreted the patient's CXR        Medication Review:     allopurinol 100 mg Oral Daily   amLODIPine 10 mg Oral Daily   arformoterol 15 mcg Nebulization BID - RT   aspirin 81 mg Oral Daily   atorvastatin 40 mg Oral Daily   azithromycin 500 mg Oral Q24H   budesonide 1 mg Nebulization BID - RT   calcitonin (salmon) 1 spray Alternating Nares Daily   clonazePAM 0.25 mg Oral BID   clopidogrel 75 mg Oral Daily   docusate sodium 100 mg Oral BID   furosemide 40 mg Oral Daily   guaifenesin-dextromethorphan 1 tablet Oral BID   hydrocortisone 10 mg Oral BID   insulin glargine 25 Units Subcutaneous Daily With Breakfast   insulin lispro 0-14 Units Subcutaneous 4x Daily With Meals & Nightly   ipratropium-albuterol 3 mL Nebulization 4x Daily - RT   isosorbide mononitrate 30 mg Oral Daily   levothyroxine 88 mcg Oral Q AM   metoclopramide 10 mg Oral TID AC   predniSONE 20 mg Oral BID With Meals   sodium chloride 10 mL Intravenous Q12H   sodium chloride 4 mL Nebulization BID - RT            ASSESSMENT:   Acute exacerbation of COPD  Questionable pneumonia/interstitial changes/respiratory bronchiolitis  Chronic respiratory failure  Tobacco abuse  Diastolic CHF  Obesity    PLAN:  Still wheezing on my today's exam but good airway movement.  Complete Zithromax  course.  Continue weaning down steroids as tolerated  Ambulate  Wean down oxygen as tolerated  Advised patient to quit smoking long-term  From the CT it appears more interstitial changes suspected to be respiratory bronchiolitis from chronic smoking.  I would recommend follow-up CT chest in 3 months to see progression  Discussed plan of care in detail with the patient  No objections to discharge with slow tapering steroids  Oxygen requirement to be determined at discharge.    Modesto Montague MD  12/21/19  4:10 PM

## 2019-12-21 NOTE — NURSING NOTE
Pt and nephew educated again at bedside about O2 while transporting patient. Risks of not wearing explained to patient who is A&O X4. Pt and nephew refused O2 loaner tank  Rachna Fuller RN

## 2020-05-24 ENCOUNTER — LAB REQUISITION (OUTPATIENT)
Dept: LAB | Facility: HOSPITAL | Age: 65
End: 2020-05-24

## 2020-05-24 DIAGNOSIS — Z00.00 ROUTINE GENERAL MEDICAL EXAMINATION AT A HEALTH CARE FACILITY: ICD-10-CM

## 2020-05-24 LAB
ANION GAP SERPL CALCULATED.3IONS-SCNC: 8.5 MMOL/L (ref 5–15)
BASOPHILS # BLD AUTO: 0.06 10*3/MM3 (ref 0–0.2)
BASOPHILS NFR BLD AUTO: 0.8 % (ref 0–1.5)
BUN BLD-MCNC: 28 MG/DL (ref 8–23)
BUN/CREAT SERPL: 20.9 (ref 7–25)
CALCIUM SPEC-SCNC: 9 MG/DL (ref 8.6–10.5)
CHLORIDE SERPL-SCNC: 100 MMOL/L (ref 98–107)
CO2 SERPL-SCNC: 26.5 MMOL/L (ref 22–29)
CREAT BLD-MCNC: 1.34 MG/DL (ref 0.57–1)
DEPRECATED RDW RBC AUTO: 48.4 FL (ref 37–54)
EOSINOPHIL # BLD AUTO: 0.29 10*3/MM3 (ref 0–0.4)
EOSINOPHIL NFR BLD AUTO: 3.8 % (ref 0.3–6.2)
ERYTHROCYTE [DISTWIDTH] IN BLOOD BY AUTOMATED COUNT: 15.1 % (ref 12.3–15.4)
GFR SERPL CREATININE-BSD FRML MDRD: 39 ML/MIN/1.73
GLUCOSE BLD-MCNC: 254 MG/DL (ref 65–99)
HCT VFR BLD AUTO: 34.6 % (ref 34–46.6)
HGB BLD-MCNC: 11.1 G/DL (ref 12–15.9)
IMM GRANULOCYTES # BLD AUTO: 0.05 10*3/MM3 (ref 0–0.05)
IMM GRANULOCYTES NFR BLD AUTO: 0.7 % (ref 0–0.5)
LYMPHOCYTES # BLD AUTO: 1.1 10*3/MM3 (ref 0.7–3.1)
LYMPHOCYTES NFR BLD AUTO: 14.4 % (ref 19.6–45.3)
MCH RBC QN AUTO: 27.8 PG (ref 26.6–33)
MCHC RBC AUTO-ENTMCNC: 32.1 G/DL (ref 31.5–35.7)
MCV RBC AUTO: 86.7 FL (ref 79–97)
MONOCYTES # BLD AUTO: 0.48 10*3/MM3 (ref 0.1–0.9)
MONOCYTES NFR BLD AUTO: 6.3 % (ref 5–12)
NEUTROPHILS # BLD AUTO: 5.64 10*3/MM3 (ref 1.7–7)
NEUTROPHILS NFR BLD AUTO: 74 % (ref 42.7–76)
NRBC BLD AUTO-RTO: 0 /100 WBC (ref 0–0.2)
NT-PROBNP SERPL-MCNC: 726.3 PG/ML (ref 5–900)
PLATELET # BLD AUTO: 183 10*3/MM3 (ref 140–450)
PMV BLD AUTO: 11.2 FL (ref 6–12)
POTASSIUM BLD-SCNC: 4.1 MMOL/L (ref 3.5–5.2)
RBC # BLD AUTO: 3.99 10*6/MM3 (ref 3.77–5.28)
SODIUM BLD-SCNC: 135 MMOL/L (ref 136–145)
WBC NRBC COR # BLD: 7.62 10*3/MM3 (ref 3.4–10.8)

## 2020-05-24 PROCEDURE — 83880 ASSAY OF NATRIURETIC PEPTIDE: CPT

## 2020-05-24 PROCEDURE — 80048 BASIC METABOLIC PNL TOTAL CA: CPT

## 2020-05-24 PROCEDURE — 85025 COMPLETE CBC W/AUTO DIFF WBC: CPT
